# Patient Record
Sex: FEMALE | Race: BLACK OR AFRICAN AMERICAN | NOT HISPANIC OR LATINO | Employment: UNEMPLOYED | URBAN - METROPOLITAN AREA
[De-identification: names, ages, dates, MRNs, and addresses within clinical notes are randomized per-mention and may not be internally consistent; named-entity substitution may affect disease eponyms.]

---

## 2017-10-09 ENCOUNTER — OFFICE VISIT (OUTPATIENT)
Dept: OBSTETRICS AND GYNECOLOGY | Facility: CLINIC | Age: 27
End: 2017-10-09
Payer: MEDICAID

## 2017-10-09 ENCOUNTER — LAB VISIT (OUTPATIENT)
Dept: LAB | Facility: HOSPITAL | Age: 27
End: 2017-10-09
Attending: ADVANCED PRACTICE MIDWIFE
Payer: MEDICAID

## 2017-10-09 VITALS
WEIGHT: 217.38 LBS | BODY MASS INDEX: 34.93 KG/M2 | HEIGHT: 66 IN | DIASTOLIC BLOOD PRESSURE: 74 MMHG | SYSTOLIC BLOOD PRESSURE: 126 MMHG

## 2017-10-09 DIAGNOSIS — R11.2 NAUSEA AND VOMITING, INTRACTABILITY OF VOMITING NOT SPECIFIED, UNSPECIFIED VOMITING TYPE: Primary | ICD-10-CM

## 2017-10-09 DIAGNOSIS — N91.1 AMENORRHEA, SECONDARY: ICD-10-CM

## 2017-10-09 PROCEDURE — 36415 COLL VENOUS BLD VENIPUNCTURE: CPT | Mod: PO

## 2017-10-09 PROCEDURE — 86592 SYPHILIS TEST NON-TREP QUAL: CPT

## 2017-10-09 PROCEDURE — 86900 BLOOD TYPING SEROLOGIC ABO: CPT

## 2017-10-09 PROCEDURE — 87340 HEPATITIS B SURFACE AG IA: CPT

## 2017-10-09 PROCEDURE — 99999 PR PBB SHADOW E&M-NEW PATIENT-LVL II: CPT | Mod: PBBFAC,,, | Performed by: ADVANCED PRACTICE MIDWIFE

## 2017-10-09 PROCEDURE — 86850 RBC ANTIBODY SCREEN: CPT

## 2017-10-09 PROCEDURE — 85025 COMPLETE CBC W/AUTO DIFF WBC: CPT

## 2017-10-09 PROCEDURE — 86762 RUBELLA ANTIBODY: CPT

## 2017-10-09 PROCEDURE — 99213 OFFICE O/P EST LOW 20 MIN: CPT | Mod: TH,S$PBB,, | Performed by: ADVANCED PRACTICE MIDWIFE

## 2017-10-09 PROCEDURE — 85660 RBC SICKLE CELL TEST: CPT

## 2017-10-09 PROCEDURE — 99202 OFFICE O/P NEW SF 15 MIN: CPT | Mod: PBBFAC,PO,25 | Performed by: ADVANCED PRACTICE MIDWIFE

## 2017-10-09 PROCEDURE — 80053 COMPREHEN METABOLIC PANEL: CPT

## 2017-10-09 PROCEDURE — 86703 HIV-1/HIV-2 1 RESULT ANTBDY: CPT

## 2017-10-09 RX ORDER — PROMETHAZINE HYDROCHLORIDE 12.5 MG/1
12.5 TABLET ORAL 4 TIMES DAILY
Qty: 30 TABLET | Refills: 2 | Status: ON HOLD | OUTPATIENT
Start: 2017-10-09 | End: 2017-10-21 | Stop reason: HOSPADM

## 2017-10-09 RX ORDER — PROMETHAZINE HYDROCHLORIDE 25 MG/1
25 SUPPOSITORY RECTAL EVERY 6 HOURS PRN
Qty: 12 SUPPOSITORY | Refills: 1 | Status: SHIPPED | OUTPATIENT
Start: 2017-10-09 | End: 2017-12-26

## 2017-10-09 RX ORDER — PROMETHAZINE HYDROCHLORIDE 25 MG/1
TABLET ORAL
Refills: 0 | Status: ON HOLD | COMMUNITY
Start: 2017-09-30 | End: 2017-10-21 | Stop reason: HOSPADM

## 2017-10-09 RX ORDER — ONDANSETRON 4 MG/1
4 TABLET, FILM COATED ORAL EVERY 6 HOURS PRN
Qty: 30 TABLET | Refills: 1 | Status: ON HOLD | OUTPATIENT
Start: 2017-10-09 | End: 2017-10-21 | Stop reason: HOSPADM

## 2017-10-09 NOTE — PROGRESS NOTES
"CC: Absence of menses    Darryl Wall is a 27 y.o. female  presents with complaint of absence of menstruation.  She reports nausea/vomIting and ptyalism   UPT is positive.     LMP- 17 EDC 18 therefore 8w3d     History reviewed. No pertinent past medical history.  History reviewed. No pertinent surgical history.  Social History     Social History    Marital status: Single     Spouse name: N/A    Number of children: N/A    Years of education: N/A     Social History Main Topics    Smoking status: Never Smoker    Smokeless tobacco: Never Used    Alcohol use No    Drug use: No    Sexual activity: Yes     Partners: Male     Other Topics Concern    None     Social History Narrative    None     Family History   Problem Relation Age of Onset    Breast cancer Maternal Grandmother      OB History    Para Term  AB Living   2         2   SAB TAB Ectopic Multiple Live Births           2      # Outcome Date GA Lbr Mo/2nd Weight Sex Delivery Anes PTL Lv   2  09    F Vag-Spont   DEB   1  06    F Vag-Spont   DEB          /74 (BP Location: Right arm)   Ht 5' 6" (1.676 m)   Wt 98.6 kg (217 lb 6 oz)   LMP 2017   BMI 35.09 kg/m²     ROS:  GENERAL: Denies weight gain or weight loss. Feeling well overall.   SKIN: Denies rash or lesions.   HEAD: Denies head injury or headache.   NODES: Denies enlarged lymph nodes.   CHEST: Denies chest pain or shortness of breath.   CARDIOVASCULAR: Denies palpitations or left sided chest pain.   ABDOMEN: No abdominal pain, constipation, diarrhea, nausea, vomiting or rectal bleeding.   URINARY: No frequency, dysuria, hematuria, or burning on urination.  REPRODUCTIVE: See HPI.   BREASTS: The patient performs breast self-examination and denies pain, lumps, or nipple discharge.   HEMATOLOGIC: No easy bruisability or excessive bleeding.   MUSCULOSKELETAL: Denies joint pain or swelling.   NEUROLOGIC: Denies syncope or weakness. "   PSYCHIATRIC: Denies depression, anxiety or mood swings.    PE:   APPEARANCE: Well nourished, well developed, in no acute distress.  AFFECT: WNL, alert and oriented x 3.  PE deferred        ASSESSMENT and PLAN:  27yr old  with secondary amenorrhea and positive UPT  Significant N/V with ptyalism  Had hyperemesis with previous 2 children- Rx Phenegan po and pr and zofran  New OB and PN lab and US     Patient was counseled today on proper weight gain based on the [place] [facility]'s recommendations based on her pre-pregnancy weight. Discussed foods to avoid in pregnancy (i.e. sushi, fish that are high in mercury, deli meat, and unpasteurized cheeses). Discussed prenatal vitamin options (i.e. stool softener, DHA). Contingency screen offered - patient desires.    No Follow-up on file.

## 2017-10-10 LAB
ABO + RH BLD: NORMAL
ALBUMIN SERPL BCP-MCNC: 3.6 G/DL
ALP SERPL-CCNC: 55 U/L
ALT SERPL W/O P-5'-P-CCNC: 8 U/L
ANION GAP SERPL CALC-SCNC: 14 MMOL/L
AST SERPL-CCNC: 15 U/L
BASOPHILS # BLD AUTO: 0.01 K/UL
BASOPHILS NFR BLD: 0.2 %
BILIRUB SERPL-MCNC: 0.5 MG/DL
BLD GP AB SCN CELLS X3 SERPL QL: NORMAL
BUN SERPL-MCNC: 9 MG/DL
CALCIUM SERPL-MCNC: 9.7 MG/DL
CHLORIDE SERPL-SCNC: 102 MMOL/L
CO2 SERPL-SCNC: 21 MMOL/L
CREAT SERPL-MCNC: 0.8 MG/DL
DIFFERENTIAL METHOD: ABNORMAL
EOSINOPHIL # BLD AUTO: 0.1 K/UL
EOSINOPHIL NFR BLD: 1.2 %
ERYTHROCYTE [DISTWIDTH] IN BLOOD BY AUTOMATED COUNT: 12.6 %
EST. GFR  (AFRICAN AMERICAN): >60 ML/MIN/1.73 M^2
EST. GFR  (NON AFRICAN AMERICAN): >60 ML/MIN/1.73 M^2
GLUCOSE SERPL-MCNC: 96 MG/DL
HCT VFR BLD AUTO: 37.1 %
HGB BLD-MCNC: 12.4 G/DL
HGB S BLD QL SOLY: NEGATIVE
LYMPHOCYTES # BLD AUTO: 2.4 K/UL
LYMPHOCYTES NFR BLD: 37 %
MCH RBC QN AUTO: 31.9 PG
MCHC RBC AUTO-ENTMCNC: 33.4 G/DL
MCV RBC AUTO: 95 FL
MONOCYTES # BLD AUTO: 0.6 K/UL
MONOCYTES NFR BLD: 8.3 %
NEUTROPHILS # BLD AUTO: 3.5 K/UL
NEUTROPHILS NFR BLD: 53.1 %
PLATELET # BLD AUTO: 358 K/UL
PMV BLD AUTO: 9.9 FL
POTASSIUM SERPL-SCNC: 3.7 MMOL/L
PROT SERPL-MCNC: 8 G/DL
RBC # BLD AUTO: 3.89 M/UL
SODIUM SERPL-SCNC: 137 MMOL/L
WBC # BLD AUTO: 6.59 K/UL

## 2017-10-11 LAB
HBV SURFACE AG SERPL QL IA: NEGATIVE
RPR SER QL: NORMAL
RUBV IGG SER-ACNC: 9.5 IU/ML
RUBV IGG SER-IMP: ABNORMAL

## 2017-10-12 LAB — HIV 1+2 AB+HIV1 P24 AG SERPL QL IA: NEGATIVE

## 2017-10-18 ENCOUNTER — HOSPITAL ENCOUNTER (INPATIENT)
Facility: HOSPITAL | Age: 27
LOS: 3 days | Discharge: HOME | End: 2017-10-21
Attending: OBSTETRICS & GYNECOLOGY | Admitting: OBSTETRICS & GYNECOLOGY
Payer: MEDICAID

## 2017-10-18 ENCOUNTER — TELEPHONE (OUTPATIENT)
Dept: OBSTETRICS AND GYNECOLOGY | Facility: CLINIC | Age: 27
End: 2017-10-18

## 2017-10-18 DIAGNOSIS — O21.0 HYPEREMESIS GRAVIDARUM: ICD-10-CM

## 2017-10-18 PROCEDURE — 25000003 PHARM REV CODE 250: Performed by: OBSTETRICS & GYNECOLOGY

## 2017-10-18 PROCEDURE — 63600175 PHARM REV CODE 636 W HCPCS: Performed by: OBSTETRICS & GYNECOLOGY

## 2017-10-18 PROCEDURE — 11000001 HC ACUTE MED/SURG PRIVATE ROOM

## 2017-10-18 RX ORDER — ONDANSETRON 2 MG/ML
4 INJECTION INTRAMUSCULAR; INTRAVENOUS EVERY 6 HOURS PRN
Status: DISCONTINUED | OUTPATIENT
Start: 2017-10-18 | End: 2017-10-19

## 2017-10-18 RX ADMIN — PROMETHAZINE HYDROCHLORIDE 25 MG: 25 INJECTION INTRAMUSCULAR; INTRAVENOUS at 10:10

## 2017-10-18 NOTE — TELEPHONE ENCOUNTER
----- Message from Faustina Go sent at 10/18/2017 12:43 PM CDT -----  Contact: Christie/[facility]  Christie called to speak with the nurse, the patient is in the ER right now. She can be contacted at [phone].    Thanks,  Faustina

## 2017-10-18 NOTE — TELEPHONE ENCOUNTER
Spoke with Christie and the patient went to [facility] instead of [facility]. Patient has had no relief with zofran, phenergan PO or suppositories. Patient's urine is showing 4+ ketones and they are giving her about 2L of fluids but will need to be admitted. The patient is fine with them discharging her so she can go to [facility]. Munira spoke with Christie from [facility] as well Dr White since she is on call. Patient is going to lateral transfer from [facility] ER to [facility] ER.

## 2017-10-19 ENCOUNTER — TELEPHONE (OUTPATIENT)
Dept: OBSTETRICS AND GYNECOLOGY | Facility: CLINIC | Age: 27
End: 2017-10-19

## 2017-10-19 LAB
ALBUMIN SERPL BCP-MCNC: 3.1 G/DL
ALP SERPL-CCNC: 43 U/L
ALT SERPL W/O P-5'-P-CCNC: 6 U/L
ANION GAP SERPL CALC-SCNC: 9 MMOL/L
AST SERPL-CCNC: 12 U/L
BILIRUB SERPL-MCNC: 0.8 MG/DL
BUN SERPL-MCNC: 7 MG/DL
CALCIUM SERPL-MCNC: 9.2 MG/DL
CHLORIDE SERPL-SCNC: 106 MMOL/L
CO2 SERPL-SCNC: 21 MMOL/L
CREAT SERPL-MCNC: 0.6 MG/DL
EST. GFR  (AFRICAN AMERICAN): >60 ML/MIN/1.73 M^2
EST. GFR  (NON AFRICAN AMERICAN): >60 ML/MIN/1.73 M^2
GLUCOSE SERPL-MCNC: 84 MG/DL
MAGNESIUM SERPL-MCNC: 2.3 MG/DL
PHOSPHATE SERPL-MCNC: 3.3 MG/DL
POTASSIUM SERPL-SCNC: 3.3 MMOL/L
PROT SERPL-MCNC: 6.9 G/DL
SODIUM SERPL-SCNC: 136 MMOL/L

## 2017-10-19 PROCEDURE — 80053 COMPREHEN METABOLIC PANEL: CPT

## 2017-10-19 PROCEDURE — 63600175 PHARM REV CODE 636 W HCPCS: Performed by: OBSTETRICS & GYNECOLOGY

## 2017-10-19 PROCEDURE — 84100 ASSAY OF PHOSPHORUS: CPT

## 2017-10-19 PROCEDURE — 99223 1ST HOSP IP/OBS HIGH 75: CPT | Mod: ,,, | Performed by: OBSTETRICS & GYNECOLOGY

## 2017-10-19 PROCEDURE — 36415 COLL VENOUS BLD VENIPUNCTURE: CPT

## 2017-10-19 PROCEDURE — 11000001 HC ACUTE MED/SURG PRIVATE ROOM

## 2017-10-19 PROCEDURE — 83735 ASSAY OF MAGNESIUM: CPT

## 2017-10-19 PROCEDURE — 25000003 PHARM REV CODE 250: Performed by: OBSTETRICS & GYNECOLOGY

## 2017-10-19 RX ORDER — METOCLOPRAMIDE HYDROCHLORIDE 5 MG/ML
10 INJECTION INTRAMUSCULAR; INTRAVENOUS EVERY 6 HOURS
Status: DISCONTINUED | OUTPATIENT
Start: 2017-10-19 | End: 2017-10-21 | Stop reason: HOSPADM

## 2017-10-19 RX ADMIN — METOCLOPRAMIDE 10 MG: 5 INJECTION, SOLUTION INTRAMUSCULAR; INTRAVENOUS at 05:10

## 2017-10-19 RX ADMIN — METOCLOPRAMIDE 10 MG: 5 INJECTION, SOLUTION INTRAMUSCULAR; INTRAVENOUS at 12:10

## 2017-10-19 RX ADMIN — PROMETHAZINE HYDROCHLORIDE 25 MG: 25 INJECTION INTRAMUSCULAR; INTRAVENOUS at 08:10

## 2017-10-19 RX ADMIN — PROMETHAZINE HYDROCHLORIDE 25 MG: 25 INJECTION INTRAMUSCULAR; INTRAVENOUS at 12:10

## 2017-10-19 RX ADMIN — ASCORBIC ACID, VITAMIN A PALMITATE, CHOLECALCIFEROL, THIAMINE HYDROCHLORIDE, RIBOFLAVIN-5 PHOSPHATE SODIUM, PYRIDOXINE HYDROCHLORIDE, NIACINAMIDE, DEXPANTHENOL, ALPHA-TOCOPHEROL ACETATE, VITAMIN K1, FOLIC ACID, BIOTIN, CYANOCOBALAMIN: 200; 3300; 200; 6; 3.6; 6; 40; 15; 10; 150; 600; 60; 5 INJECTION, SOLUTION INTRAVENOUS at 06:10

## 2017-10-19 RX ADMIN — PROMETHAZINE HYDROCHLORIDE 25 MG: 25 INJECTION INTRAMUSCULAR; INTRAVENOUS at 06:10

## 2017-10-19 RX ADMIN — ASCORBIC ACID, VITAMIN A PALMITATE, CHOLECALCIFEROL, THIAMINE HYDROCHLORIDE, RIBOFLAVIN-5 PHOSPHATE SODIUM, PYRIDOXINE HYDROCHLORIDE, NIACINAMIDE, DEXPANTHENOL, ALPHA-TOCOPHEROL ACETATE, VITAMIN K1, FOLIC ACID, BIOTIN, CYANOCOBALAMIN: 200; 3300; 200; 6; 3.6; 6; 40; 15; 10; 150; 600; 60; 5 INJECTION, SOLUTION INTRAVENOUS at 01:10

## 2017-10-19 NOTE — PLAN OF CARE
Problem: Patient Care Overview  Goal: Plan of Care Review  Outcome: Ongoing (interventions implemented as appropriate)  Reviewed POC, including indications and possible side effects of administered medications. Pt verbalized understanding and teach back. Reported nausea early in the shift. Medication and non-pharmacological interventions effective. Pt remains free from injury. Spouse @ BS.    12 hour chart check complete.

## 2017-10-19 NOTE — PLAN OF CARE
CM met with patient at the bedside to assess for discharge needs.  Patient states that she has support at home and is independent.  She does not anticipate any discharge needs.  CM provided patient with a transitional care folder, information on advanced directives, information on pharmacy bedside delivery, and discharge planning begins on admission with contact information for [facility]ier[facility]    D/C Plan:  Home with no needs    KIANNA handed off to Brenda      10/19/17 8583   Discharge Assessment   Assessment Type Discharge Planning Assessment   Confirmed/corrected address and phone number on facesheet? Yes   Assessment information obtained from? Patient;Medical Record   Expected Length of Stay (days) (TBD)   Communicated expected length of stay with patient/caregiver yes   Prior to hospitilization cognitive status: Alert/Oriented   Prior to hospitalization functional status: Independent   Current cognitive status: Alert/Oriented   Current Functional Status: Independent   Facility Arrived From: home   Able to Return to Prior Arrangements yes   Is patient able to care for self after discharge? Yes   Who are your caregiver(s) and their phone number(s)? Eleazar Gross,   [phone]   Patient's perception of discharge disposition home or selfcare   Readmission Within The Last 30 Days no previous admission in last 30 days   Patient currently being followed by outpatient case management? No   Patient currently receives any other outside agency services? No   Equipment Currently Used at Home none   Do you have any problems affording any of your prescribed medications? No   Is the patient taking medications as prescribed? yes   Does the patient have transportation home? Yes   Transportation Available car;family or friend will provide   Dialysis Name and Scheduled days NA   Does the patient receive services at the [facility]? No   Discharge Plan A Home with family   Discharge Plan B Home with family   Patient/Family In  Agreement With Plan yes

## 2017-10-19 NOTE — PROGRESS NOTES
Pt stated that Zofran never works for her, she received Zofran today in [facility] and it did not help her again. MD notified and promethazine administered per order.

## 2017-10-19 NOTE — SUBJECTIVE & OBJECTIVE
Obstetric History       T0      L2     SAB0   TAB0   Ectopic0   Multiple0   Live Births2       # Outcome Date GA Lbr Mo/2nd Weight Sex Delivery Anes PTL Lv   2  09    F Vag-Spont   DEB   1  06    F Vag-Spont   DEB        History reviewed. No pertinent past medical history.  History reviewed. No pertinent surgical history.    PTA Medications   Medication Sig    ondansetron (ZOFRAN, AS HYDROCHLORIDE,) 4 MG tablet Take 1 tablet (4 mg total) by mouth every 6 (six) hours as needed for Nausea.    promethazine (PHENERGAN) 12.5 MG Tab Take 1 tablet (12.5 mg total) by mouth 4 (four) times daily.    promethazine (PHENERGAN) 25 MG suppository Place 1 suppository (25 mg total) rectally every 6 (six) hours as needed for Nausea.    promethazine (PHENERGAN) 25 MG tablet TK 1 T PO Q 6 H PRN FOR NV       Review of patient's allergies indicates:  No Known Allergies     Family History     Problem Relation (Age of Onset)    Breast cancer Maternal Grandmother        Social History Main Topics    Smoking status: Never Smoker    Smokeless tobacco: Never Used    Alcohol use No    Drug use: No    Sexual activity: Yes     Partners: Male     Review of Systems   Constitutional: Positive for activity change, appetite change and fatigue.   Gastrointestinal: Positive for nausea and vomiting.   All other systems reviewed and are negative.     Objective:     Vital Signs (Most Recent):  Temp: 98.4 °F (36.9 °C) (10/19/17 032)  Pulse: 68 (10/19/17 0322)  Resp: 18 (10/19/17 0322)  BP: (!) 105/58 (10/19/17 032)  SpO2: 98 % (10/19/17 0322) Vital Signs (24h Range):  Temp:  [97.5 °F (36.4 °C)-98.4 °F (36.9 °C)] 98.4 °F (36.9 °C)  Pulse:  [68-84] 68  Resp:  [18] 18  SpO2:  [96 %-98 %] 98 %  BP: (105-134)/(58-65) 105/58     Weight: 99.2 kg (218 lb 11.2 oz)  Body mass index is 34.25 kg/m².    Patient's last menstrual period was 2017 (exact date).    Physical Exam:   Constitutional: She is oriented  to person, place, and time. No distress.        Pulmonary/Chest: Effort normal.        Abdominal: Soft. She exhibits no distension. There is no tenderness. There is no guarding.     Genitourinary:   Genitourinary Comments: Denies bleeding/abnormal discharg           Musculoskeletal: Moves all extremeties.       Neurological: She is alert and oriented to person, place, and time.    Skin: Skin is warm and dry. She is not diaphoretic.    Psychiatric: She has a normal mood and affect. Her behavior is normal.       Laboratory:  I have personallly reviewed all pertinent lab results from the last 24 hours.    Diagnostic Results:  none

## 2017-10-19 NOTE — PROGRESS NOTES
Patient arrived to 552 as direct admit, accompanied by family member. Dr. White notified. Awaiting new orders.

## 2017-10-19 NOTE — TELEPHONE ENCOUNTER
----- Message from Brittney White MD sent at 10/19/2017  8:17 AM CDT -----  Pt in hospital. Getting dating u/s here. Can cancel ignacia's order

## 2017-10-19 NOTE — H&P
[facility] -   Obstetrics & Gynecology  History & Physical    Patient Name: Darryl Wall  MRN: [id number]  Admission Date: 10/18/2017  Primary Care Provider: J Carlos Haney NP    Subjective:     Chief Complaint/Reason for Admission: hyperemesis gravidarum    History of Present Illness:  26 yo  with pregnancy approximately 9w6d admitted for hyperemesis gravidarum        Obstetric History       T0      L2     SAB0   TAB0   Ectopic0   Multiple0   Live Births2       # Outcome Date GA Lbr Mo/2nd Weight Sex Delivery Anes PTL Lv   2  09    F Vag-Spont   DEB   1  06    F Vag-Spont   DEB        History reviewed. No pertinent past medical history.  History reviewed. No pertinent surgical history.    PTA Medications   Medication Sig    ondansetron (ZOFRAN, AS HYDROCHLORIDE,) 4 MG tablet Take 1 tablet (4 mg total) by mouth every 6 (six) hours as needed for Nausea.    promethazine (PHENERGAN) 12.5 MG Tab Take 1 tablet (12.5 mg total) by mouth 4 (four) times daily.    promethazine (PHENERGAN) 25 MG suppository Place 1 suppository (25 mg total) rectally every 6 (six) hours as needed for Nausea.    promethazine (PHENERGAN) 25 MG tablet TK 1 T PO Q 6 H PRN FOR NV       Review of patient's allergies indicates:  No Known Allergies     Family History     Problem Relation (Age of Onset)    Breast cancer Maternal Grandmother        Social History Main Topics    Smoking status: Never Smoker    Smokeless tobacco: Never Used    Alcohol use No    Drug use: No    Sexual activity: Yes     Partners: Male     Review of Systems   Constitutional: Positive for activity change, appetite change and fatigue.   Gastrointestinal: Positive for nausea and vomiting.   All other systems reviewed and are negative.     Objective:     Vital Signs (Most Recent):  Temp: 98.4 °F (36.9 °C) (10/19/17 0322)  Pulse: 68 (10/19/17 0322)  Resp: 18 (10/19/17 0322)  BP: (!) 105/58 (10/19/17  0322)  SpO2: 98 % (10/19/17 0322) Vital Signs (24h Range):  Temp:  [97.5 °F (36.4 °C)-98.4 °F (36.9 °C)] 98.4 °F (36.9 °C)  Pulse:  [68-84] 68  Resp:  [18] 18  SpO2:  [96 %-98 %] 98 %  BP: (105-134)/(58-65) 105/58     Weight: 99.2 kg (218 lb 11.2 oz)  Body mass index is 34.25 kg/m².    Patient's last menstrual period was 08/11/2017 (exact date).    Physical Exam:   Constitutional: She is oriented to person, place, and time. No distress.        Pulmonary/Chest: Effort normal.        Abdominal: Soft. She exhibits no distension. There is no tenderness. There is no guarding.     Genitourinary:   Genitourinary Comments: Denies bleeding/abnormal discharg           Musculoskeletal: Moves all extremeties.       Neurological: She is alert and oriented to person, place, and time.    Skin: Skin is warm and dry. She is not diaphoretic.    Psychiatric: She has a normal mood and affect. Her behavior is normal.       Laboratory:  I have personallly reviewed all pertinent lab results from the last 24 hours.    Diagnostic Results:  none    Assessment/Plan:     1. Hyperemesis gravidarum-on phenergan IV, will add reglan  2. Unconfirmed pregnancy-will do dating ultrasound  3. clinimix    Brittney White MD  Obstetrics & Gynecology  [facility] -

## 2017-10-19 NOTE — HOSPITAL COURSE
Pt receiving IVF & anti-emetics as well as electrolyte replacement. Reports zofran never works for her & phenergan po at home was not helping much.

## 2017-10-20 PROBLEM — O26.91: Status: ACTIVE | Noted: 2017-10-20

## 2017-10-20 LAB
ANION GAP SERPL CALC-SCNC: 10 MMOL/L
BUN SERPL-MCNC: 6 MG/DL
CALCIUM SERPL-MCNC: 9.4 MG/DL
CHLORIDE SERPL-SCNC: 106 MMOL/L
CO2 SERPL-SCNC: 19 MMOL/L
CREAT SERPL-MCNC: 0.6 MG/DL
EST. GFR  (AFRICAN AMERICAN): >60 ML/MIN/1.73 M^2
EST. GFR  (NON AFRICAN AMERICAN): >60 ML/MIN/1.73 M^2
GLUCOSE SERPL-MCNC: 76 MG/DL
POTASSIUM SERPL-SCNC: 3.6 MMOL/L
SODIUM SERPL-SCNC: 135 MMOL/L

## 2017-10-20 PROCEDURE — 97802 MEDICAL NUTRITION INDIV IN: CPT

## 2017-10-20 PROCEDURE — 25000003 PHARM REV CODE 250: Performed by: OBSTETRICS & GYNECOLOGY

## 2017-10-20 PROCEDURE — 63600175 PHARM REV CODE 636 W HCPCS: Performed by: OBSTETRICS & GYNECOLOGY

## 2017-10-20 PROCEDURE — 11000001 HC ACUTE MED/SURG PRIVATE ROOM

## 2017-10-20 PROCEDURE — 99232 SBSQ HOSP IP/OBS MODERATE 35: CPT | Mod: ,,, | Performed by: PHYSICIAN ASSISTANT

## 2017-10-20 PROCEDURE — 36415 COLL VENOUS BLD VENIPUNCTURE: CPT

## 2017-10-20 PROCEDURE — 80048 BASIC METABOLIC PNL TOTAL CA: CPT

## 2017-10-20 RX ORDER — DEXTROSE MONOHYDRATE AND SODIUM CHLORIDE 5; .9 G/100ML; G/100ML
INJECTION, SOLUTION INTRAVENOUS CONTINUOUS
Status: DISCONTINUED | OUTPATIENT
Start: 2017-10-20 | End: 2017-10-21 | Stop reason: HOSPADM

## 2017-10-20 RX ORDER — SCOLOPAMINE TRANSDERMAL SYSTEM 1 MG/1
1 PATCH, EXTENDED RELEASE TRANSDERMAL
Status: DISCONTINUED | OUTPATIENT
Start: 2017-10-20 | End: 2017-10-21 | Stop reason: HOSPADM

## 2017-10-20 RX ADMIN — ASCORBIC ACID, VITAMIN A PALMITATE, CHOLECALCIFEROL, THIAMINE HYDROCHLORIDE, RIBOFLAVIN-5 PHOSPHATE SODIUM, PYRIDOXINE HYDROCHLORIDE, NIACINAMIDE, DEXPANTHENOL, ALPHA-TOCOPHEROL ACETATE, VITAMIN K1, FOLIC ACID, BIOTIN, CYANOCOBALAMIN: 200; 3300; 200; 6; 3.6; 6; 40; 15; 10; 150; 600; 60; 5 INJECTION, SOLUTION INTRAVENOUS at 08:10

## 2017-10-20 RX ADMIN — PROMETHAZINE HYDROCHLORIDE 25 MG: 25 INJECTION INTRAMUSCULAR; INTRAVENOUS at 07:10

## 2017-10-20 RX ADMIN — SCOLOPAMINE TRANSDERMAL SYSTEM 1 PATCH: 1 PATCH, EXTENDED RELEASE TRANSDERMAL at 09:10

## 2017-10-20 RX ADMIN — METOCLOPRAMIDE 10 MG: 5 INJECTION, SOLUTION INTRAMUSCULAR; INTRAVENOUS at 12:10

## 2017-10-20 RX ADMIN — METOCLOPRAMIDE 10 MG: 5 INJECTION, SOLUTION INTRAMUSCULAR; INTRAVENOUS at 05:10

## 2017-10-20 RX ADMIN — DEXTROSE AND SODIUM CHLORIDE: 5; .9 INJECTION, SOLUTION INTRAVENOUS at 12:10

## 2017-10-20 RX ADMIN — METOCLOPRAMIDE 10 MG: 5 INJECTION, SOLUTION INTRAMUSCULAR; INTRAVENOUS at 06:10

## 2017-10-20 RX ADMIN — METOCLOPRAMIDE 10 MG: 5 INJECTION, SOLUTION INTRAMUSCULAR; INTRAVENOUS at 11:10

## 2017-10-20 NOTE — ASSESSMENT & PLAN NOTE
Patient still having nausea with reglan & IV phenergan; will add scopolamine patch and attempt advancing diet today  Continue Clinimix for IV hydration & electrolyte replacement  repeat BMP   US shows intrauterine fetus of appx 10w1d gestation.

## 2017-10-20 NOTE — PLAN OF CARE
Problem: Patient Care Overview  Goal: Plan of Care Review  Outcome: Ongoing (interventions implemented as appropriate)  Pt reports n/v, clinimix administered per order, Pt remains free of injury, pt denies pain, no s/s of distress. 24 hour chart check completed. Will continue to monitor.

## 2017-10-20 NOTE — SUBJECTIVE & OBJECTIVE
Interval History:  Patient still reports nausea that is not relieved by current medications; she tried to eat something but was unable to tolerate a diet at this time - she vomited after she ate     Scheduled Meds:   metoclopramide HCl  10 mg Intravenous Q6H    scopolamine  1 patch Transdermal Q3 Days     Continuous Infusions:   Amino acid 4.25% - dextrose 5% (CLINIMIX-E) solution with additives (1L provides 42.5 gm AA, 50 gm CHO (170 kcal/L dextrose), Na 35, K 30, Mg 5, Ca 4.5, Acetate 70, Cl 39, Phos 15) 125 mL/hr at 10/20/17 0824     PRN Meds:promethazine (PHENERGAN) IVPB    Review of patient's allergies indicates:  No Known Allergies    Objective:     Vital Signs (Most Recent):  Temp: 98.7 °F (37.1 °C) (10/20/17 0757)  Pulse: 77 (10/20/17 0757)  Resp: 16 (10/20/17 0757)  BP: 122/62 (10/20/17 0757)  SpO2: 98 % (10/20/17 0757) Vital Signs (24h Range):  Temp:  [97.2 °F (36.2 °C)-98.7 °F (37.1 °C)] 98.7 °F (37.1 °C)  Pulse:  [74-79] 77  Resp:  [16-18] 16  SpO2:  [98 %-100 %] 98 %  BP: (112-129)/(59-66) 122/62     Weight: 99.2 kg (218 lb 11.2 oz)  Body mass index is 34.25 kg/m².  Patient's last menstrual period was 08/11/2017 (exact date).    I&O (Last 24H):  Intake/Output - Last 3 Shifts       10/18 0700 - 10/19 0659 10/19 0700 - 10/20 0659 10/20 0700 - 10/21 0659    IV Piggyback 50 150     .3 2972.9     Total Intake(mL/kg) 606.3 (6.1) 3122.9 (31.5)     Net +606.3 +3122.9             Urine Occurrence 1 x      Stool Occurrence  1 x             Laboratory:  Recent Lab Results     None        I have personallly reviewed all pertinent lab results from the last 24 hours.    ROS  Constitutional: Positive for activity change, appetite change and fatigue.   Gastrointestinal: Positive for nausea and vomiting.   All other systems reviewed and are negative.      Physical Exam:   Constitutional: She is oriented to person, place, and time. No distress.        Pulmonary/Chest: Effort normal.        Abdominal: Soft. She  exhibits no distension. There is no tenderness. There is no guarding.     Genitourinary:   Genitourinary Comments: Denies bleeding/abnormal discharg           Musculoskeletal: Moves all extremeties.       Neurological: She is alert and oriented to person, place, and time.    Skin: Skin is warm and dry. She is not diaphoretic.    Psychiatric: She has a normal mood and affect. Her behavior is normal.

## 2017-10-20 NOTE — PLAN OF CARE
Problem: Patient Care Overview  Goal: Plan of Care Review  Outcome: Ongoing (interventions implemented as appropriate)  Remained free from injury. Ambulating independently. Throwing up when sipping water. Clinimix contined. 12 hour chart check complete.

## 2017-10-20 NOTE — PLAN OF CARE
Problem: Patient Care Overview  Goal: Plan of Care Review  Outcome: Ongoing (interventions implemented as appropriate)  Fall precautions maintained, pt free from falls/injuries  PT repositions and ambulates independently  Pt has no c/o pain  Nausea and vomiting managed w/ scopolamine patch and reglan  clinimix DC, pt is on clear liquid diet  POC and medications reviewed with pt, pt verbalized understanding.  Side rails x 2 up, bed locked and low.  No signs/symptoms of acute distress.  Chart check done. Will cont to monitor.

## 2017-10-20 NOTE — PROGRESS NOTES
"  [facility] -   Adult Nutrition  Consult Note    SUMMARY     Recommendations    Recommendation/Intervention: 1. Advance diet when medically able as tolerated to Regular. 2. Consider adding oral supplements with meals (boost plus). 3. Will continue to encourage PO intake.   Goals: Intake of 85 % - 100 % of meals by next RD visit  Nutrition Goal Status: new  Communication of RD Recs:  (care plan and sticky note)    Reason for Assessment    Reason for Assessment: identified at risk by screening criteria   Diagnosis:  1. Hyperemesis gravidarum      Hx:History reviewed. No pertinent past medical history.       General Information Comments: Patient reports no nausea or vomiting at this time. Patient reports PO intake 50 %.  10w1d gestation.     Nutrition Discharge Planning: General healthful diet     Nutrition Prescription Ordered    Current Diet Order: full liquid diet       Evaluation of Received Nutrients/Fluid Intake         Energy Calories Required: not meeting needs         Protein Required: not meeting needs        % Intake of Estimated Energy Needs: 25 - 50 %  % Meal Intake: 50%     Nutrition Risk Screen     Nutrition Risk Screen: no indicators present, other (see comments) (hyperemesis)    Nutrition/Diet History       Typical Food/Fluid Intake: good intake at home  Food Preferences: none reported including Restorationist or cultural           Labs/Tests/Procedures/Meds       Pertinent Labs Reviewed: reviewed  Pertinent Labs Comments: K 3.3, Alb 3.1, ALT 6   Pertinent Medications Reviewed: reviewed       Physical Findings    Overall Physical Appearance: nourished     Oral/Mouth Cavity:  (teeth missing)  Skin:  (Dez Score 22)    Anthropometrics    Temp: 98 °F (36.7 °C)     Height: 5' 7" (170.2 cm)  Weight Method: Bed Scale  Weight: 99.2 kg (218 lb 11.1 oz)     Ideal Body Weight (IBW), Female: 135 lb     % Ideal Body Weight, Female (lb): 162 lb  BMI (Calculated): 34.3  BMI Grade: 30 - 34.9- obesity - " grade I        Estimated/Assessed Needs    Weight Used For Calorie Calculations: 99.2 kg (218 lb 11.1 oz)   Height (cm): 170.2 cm  Energy Calorie Requirements (kcal): 2200 kcal      RMR ([place]-St. Jeor Equation): 1749.62     Weight Used For Protein Calculations: 99.2 kg (218 lb 11.1 oz)     1.1 gm Protein (gm): 109.35     Fluid Need Method: RDA Method (1ml/amada or per MD)        RDA Method (mL): 2200               Assessment and Plan    * Hyperemesis gravidarum    Nutrition Problem  Inadequate energy intake     Related to (etiology):   Current medical condition    Signs and Symptoms (as evidenced by):   PO intake ~ 50 %, not meeting needs    Interventions/Recommendations (treatment strategy):  See RD recs above    Nutrition Diagnosis Status:   New              Monitor and Evaluation    Food and Nutrient Intake: energy intake  Food and Nutrient Adminstration: diet order  Knowledge/Beliefs/Attitudes: beliefs and attitudes  Anthropometric Measurements: weight  Biochemical Data, Medical Tests and Procedures: electrolyte and renal panel, glucose/endocrine profile  Nutrition-Focused Physical Findings: overall appearance    Nutrition Follow-Up    RD Follow-up?: Yes (2xweekly)

## 2017-10-20 NOTE — PROGRESS NOTES
[facility]  Obstetrics & Gynecology  Progress Note    Patient Name: Darryl Wall  MRN: [id number]  Admission Date: 10/18/2017  Primary Care Provider: J Carlos Haney NP  Principal Problem: Hyperemesis gravidarum    Subjective:     HPI:  28 yo  with pregnancy approximately 9w6d admitted for hyperemesis gravidarum    Interval History:  Patient still reports nausea that is not relieved by current medications; she tried to eat something but was unable to tolerate a diet at this time - she vomited after she ate     Scheduled Meds:   metoclopramide HCl  10 mg Intravenous Q6H    scopolamine  1 patch Transdermal Q3 Days     Continuous Infusions:   Amino acid 4.25% - dextrose 5% (CLINIMIX-E) solution with additives (1L provides 42.5 gm AA, 50 gm CHO (170 kcal/L dextrose), Na 35, K 30, Mg 5, Ca 4.5, Acetate 70, Cl 39, Phos 15) 125 mL/hr at 10/20/17 0824     PRN Meds:promethazine (PHENERGAN) IVPB    Review of patient's allergies indicates:  No Known Allergies    Objective:     Vital Signs (Most Recent):  Temp: 98.7 °F (37.1 °C) (10/20/17 075)  Pulse: 77 (10/20/17 075)  Resp: 16 (10/20/17 0757)  BP: 122/62 (10/20/17 0757)  SpO2: 98 % (10/20/17 075) Vital Signs (24h Range):  Temp:  [97.2 °F (36.2 °C)-98.7 °F (37.1 °C)] 98.7 °F (37.1 °C)  Pulse:  [74-79] 77  Resp:  [16-18] 16  SpO2:  [98 %-100 %] 98 %  BP: (112-129)/(59-66) 122/62     Weight: 99.2 kg (218 lb 11.2 oz)  Body mass index is 34.25 kg/m².  Patient's last menstrual period was 2017 (exact date).    I&O (Last 24H):  Intake/Output - Last 3 Shifts       10/18 0700 - 10/19 0659 10/19 0700 - 10/20 0659 10/20 0700 - 10/21 0659    IV Piggyback 50 150     .3 2972.9     Total Intake(mL/kg) 606.3 (6.1) 3122.9 (31.5)     Net +606.3 +3122.9             Urine Occurrence 1 x      Stool Occurrence  1 x             Laboratory:  Recent Lab Results     None        I have personallly reviewed all pertinent lab results from the last 24  hours.    ROS  Constitutional: Positive for activity change, appetite change and fatigue.   Gastrointestinal: Positive for nausea and vomiting.   All other systems reviewed and are negative.      Physical Exam:   Constitutional: She is oriented to person, place, and time. No distress.        Pulmonary/Chest: Effort normal.        Abdominal: Soft. She exhibits no distension. There is no tenderness. There is no guarding.     Genitourinary:   Genitourinary Comments: Denies bleeding/abnormal discharg           Musculoskeletal: Moves all extremeties.       Neurological: She is alert and oriented to person, place, and time.    Skin: Skin is warm and dry. She is not diaphoretic.    Psychiatric: She has a normal mood and affect. Her behavior is normal.            Assessment/Plan:     * Hyperemesis gravidarum    Patient still having nausea with reglan & IV phenergan; will add scopolamine patch and attempt advancing diet today  Continue Clinimix for IV hydration & electrolyte replacement  repeat BMP   US shows intrauterine fetus of appx 10w1d gestation.                   Lianne Banks PA-C  Obstetrics & Gynecology  [facility]

## 2017-10-20 NOTE — ASSESSMENT & PLAN NOTE
Nutrition Problem  Inadequate energy intake     Related to (etiology):   Current medical condition    Signs and Symptoms (as evidenced by):   PO intake ~ 50 %, not meeting needs    Interventions/Recommendations (treatment strategy):  See RD recs above    Nutrition Diagnosis Status:   New

## 2017-10-20 NOTE — PLAN OF CARE
Problem: Patient Care Overview  Goal: Plan of Care Review  Outcome: Ongoing (interventions implemented as appropriate)  Recommendations     Recommendation/Intervention: 1. Advance diet when medically able as tolerated to Regular. 2. Consider adding oral supplements with meals (boost plus). 3. Will continue to encourage PO intake.   Goals: Intake of 85 % - 100 % of meals by next RD visit  Nutrition Goal Status: new  Communication of RD Recs:  (care plan and sticky note)

## 2017-10-21 ENCOUNTER — NURSE TRIAGE (OUTPATIENT)
Dept: ADMINISTRATIVE | Facility: CLINIC | Age: 27
End: 2017-10-21

## 2017-10-21 VITALS
OXYGEN SATURATION: 99 % | RESPIRATION RATE: 14 BRPM | BODY MASS INDEX: 34.33 KG/M2 | TEMPERATURE: 99 F | WEIGHT: 218.69 LBS | HEIGHT: 67 IN | SYSTOLIC BLOOD PRESSURE: 118 MMHG | DIASTOLIC BLOOD PRESSURE: 69 MMHG | HEART RATE: 84 BPM

## 2017-10-21 PROCEDURE — 99232 SBSQ HOSP IP/OBS MODERATE 35: CPT | Mod: ,,, | Performed by: OBSTETRICS & GYNECOLOGY

## 2017-10-21 PROCEDURE — 63600175 PHARM REV CODE 636 W HCPCS: Performed by: OBSTETRICS & GYNECOLOGY

## 2017-10-21 PROCEDURE — 25000003 PHARM REV CODE 250: Performed by: OBSTETRICS & GYNECOLOGY

## 2017-10-21 RX ORDER — METOCLOPRAMIDE 10 MG/1
10 TABLET ORAL EVERY 6 HOURS PRN
Qty: 60 TABLET | Refills: 3 | Status: SHIPPED | OUTPATIENT
Start: 2017-10-21 | End: 2017-12-26 | Stop reason: SDUPTHER

## 2017-10-21 RX ORDER — SCOLOPAMINE TRANSDERMAL SYSTEM 1 MG/1
1 PATCH, EXTENDED RELEASE TRANSDERMAL
Qty: 4 PATCH | Refills: 3 | Status: SHIPPED | OUTPATIENT
Start: 2017-10-23 | End: 2017-11-18 | Stop reason: SDUPTHER

## 2017-10-21 RX ORDER — PROMETHAZINE HYDROCHLORIDE 25 MG/1
25 TABLET ORAL EVERY 6 HOURS PRN
COMMUNITY
End: 2017-12-26 | Stop reason: SDUPTHER

## 2017-10-21 RX ADMIN — METOCLOPRAMIDE 10 MG: 5 INJECTION, SOLUTION INTRAMUSCULAR; INTRAVENOUS at 12:10

## 2017-10-21 RX ADMIN — DEXTROSE AND SODIUM CHLORIDE: 5; .9 INJECTION, SOLUTION INTRAVENOUS at 05:10

## 2017-10-21 RX ADMIN — METOCLOPRAMIDE 10 MG: 5 INJECTION, SOLUTION INTRAMUSCULAR; INTRAVENOUS at 11:10

## 2017-10-21 RX ADMIN — METOCLOPRAMIDE 10 MG: 5 INJECTION, SOLUTION INTRAMUSCULAR; INTRAVENOUS at 05:10

## 2017-10-21 RX ADMIN — PROMETHAZINE HYDROCHLORIDE 25 MG: 25 INJECTION INTRAMUSCULAR; INTRAVENOUS at 11:10

## 2017-10-21 NOTE — DISCHARGE SUMMARY
[facility] -   Obstetrics & Gynecology  Discharge Summary    Patient Name: Darryl Wall  MRN: [id number]  Admission Date: 10/18/2017  Hospital Length of Stay: 3 days  Discharge Date and Time: 10/21/17  Attending Physician: Brittney White MD   Discharging Provider: Brittney White MD  Primary Care Provider: J Carlos Haney NP    HPI:  26 yo  with pregnancy approximately 9w6d admitted for hyperemesis gravidarum    Hospital Course:  Pt receiving IVF & anti-emetics as well as electrolyte replacement. Reports zofran never works for her & phenergan po at home was not helping much.    * No surgery found *         Significant Diagnostic Studies: Labs:   BMP:   Recent Labs  Lab 10/20/17  1603   GLU 76   *   K 3.6      CO2 19*   BUN 6   CREATININE 0.6   CALCIUM 9.4    and CMP   Recent Labs  Lab 10/20/17  1603   *   K 3.6      CO2 19*   GLU 76   BUN 6   CREATININE 0.6   CALCIUM 9.4   ANIONGAP 10   ESTGFRAFRICA >60   EGFRNONAA >60       Pending Diagnostic Studies:     None        Final Active Diagnoses:    Diagnosis Date Noted POA    PRINCIPAL PROBLEM:  Hyperemesis gravidarum [O21.0] 10/18/2017 Yes    Complication related to pregnancy, first trimester [O26.91] 10/20/2017 Yes      Problems Resolved During this Admission:    Diagnosis Date Noted Date Resolved POA        Discharged Condition: good    Disposition:     Follow Up:  Follow-up Information     Please follow up.    Why:  keep current appt scheduled next week               Patient Instructions:   No discharge procedures on file.  Medications:  Reconciled Home Medications:   Current Discharge Medication List      START taking these medications    Details   metoclopramide HCl (REGLAN) 10 MG tablet Take 1 tablet (10 mg total) by mouth every 6 (six) hours as needed.  Qty: 60 tablet, Refills: 3      scopolamine (TRANSDERM-SCOP) 1.3-1.5 mg (1 mg over 3 days) Place 1 patch onto the skin Every 3 (three) days.  Qty: 4 patch,  Refills: 3         CONTINUE these medications which have NOT CHANGED    Details   promethazine (PHENERGAN) 25 MG suppository Place 1 suppository (25 mg total) rectally every 6 (six) hours as needed for Nausea.  Qty: 12 suppository, Refills: 1    Associated Diagnoses: Nausea and vomiting, intractability of vomiting not specified, unspecified vomiting type         STOP taking these medications       ondansetron (ZOFRAN, AS HYDROCHLORIDE,) 4 MG tablet Comments:   Reason for Stopping:         promethazine (PHENERGAN) 12.5 MG Tab Comments:   Reason for Stopping:         promethazine (PHENERGAN) 25 MG tablet Comments:   Reason for Stopping:               Brittney White MD  Obstetrics & Gynecology  [facility] -

## 2017-10-21 NOTE — PLAN OF CARE
Problem: Patient Care Overview  Goal: Plan of Care Review  Outcome: Outcome(s) achieved Date Met: 10/21/17  To be discharged today. Ambulating independently, tolerating well. Tolerating regular diet. Chart check complete.

## 2017-10-21 NOTE — SUBJECTIVE & OBJECTIVE
Interval History: pt currently on phenergan & reglan IV, scopolamine patch. Pt reports tolerated few bites of regular food & juice here & there, has more energy now. Would like to be discharged home    Scheduled Meds:   metoclopramide HCl  10 mg Intravenous Q6H    scopolamine  1 patch Transdermal Q3 Days     Continuous Infusions:   dextrose 5 % and 0.9 % NaCl 125 mL/hr at 10/21/17 0546     PRN Meds:promethazine (PHENERGAN) IVPB    Review of patient's allergies indicates:  No Known Allergies    Objective:     Vital Signs (Most Recent):  Temp: 98.5 °F (36.9 °C) (10/21/17 0859)  Pulse: 84 (10/21/17 0859)  Resp: 14 (10/21/17 0859)  BP: 118/69 (10/21/17 0859)  SpO2: 99 % (10/21/17 0859) Vital Signs (24h Range):  Temp:  [98 °F (36.7 °C)-99 °F (37.2 °C)] 98.5 °F (36.9 °C)  Pulse:  [69-95] 84  Resp:  [14-18] 14  SpO2:  [96 %-100 %] 99 %  BP: (102-118)/(57-69) 118/69     Weight: 99.2 kg (218 lb 11.1 oz)  Body mass index is 34.25 kg/m².  Patient's last menstrual period was 08/11/2017 (exact date).    I&O (Last 24H):      Intake/Output Summary (Last 24 hours) at 10/21/17 1028  Last data filed at 10/21/17 0600    <table CELLSPACING=0 CELLPADDING=0 BORDER=1>  <tr><td PJUGA=739%></td>  <td MTFFS=965%><b>Gross per 24 hour</b></td></tr>  <tr><td OVOTT=004%>Intake</td>  <td YRBBX=950%>          3127.08 ml</td></tr>  <tr><td XMEBM=251%>Output</td>  <td MKGBF=124%>                0 ml</td></tr>  <tr><td WVWUJ=656%><b>Net</b></td>  <td SYOGL=027%>          3127.08 ml</td></tr>  </table>      Laboratory:  I have personallly reviewed all pertinent lab results from the last 24 hours.    Diagnostic Results:  US: Reviewed    Physical Exam:   Constitutional: She is oriented to person, place, and time. No distress.        Pulmonary/Chest: Effort normal.        Abdominal: Soft. She exhibits no distension. There is no tenderness.     Genitourinary:   Genitourinary Comments: Denies bleeding           Musculoskeletal: Normal range of motion.        Neurological: She is alert and oriented to person, place, and time.    Skin: Skin is warm and dry.    Psychiatric: She has a normal mood and affect. Her behavior is normal.

## 2017-10-21 NOTE — PROGRESS NOTES
[facility] -   Obstetrics & Gynecology  Progress Note    Patient Name: Darryl Wall  MRN: [id number]  Admission Date: 10/18/2017  Primary Care Provider: J Carlos Haney NP  Principal Problem: Hyperemesis gravidarum    Subjective:     HPI:  26 yo  with pregnancy approximately 9w6d admitted for hyperemesis gravidarum    Interval History: pt currently on phenergan & reglan IV, scopolamine patch. Pt reports tolerated few bites of regular food & juice here & there, has more energy now. Would like to be discharged home    Scheduled Meds:   metoclopramide HCl  10 mg Intravenous Q6H    scopolamine  1 patch Transdermal Q3 Days     Continuous Infusions:   dextrose 5 % and 0.9 % NaCl 125 mL/hr at 10/21/17 0546     PRN Meds:promethazine (PHENERGAN) IVPB    Review of patient's allergies indicates:  No Known Allergies    Objective:     Vital Signs (Most Recent):  Temp: 98.5 °F (36.9 °C) (10/21/17 0859)  Pulse: 84 (10/21/17 0859)  Resp: 14 (10/21/17 0859)  BP: 118/69 (10/21/17 0859)  SpO2: 99 % (10/21/17 0859) Vital Signs (24h Range):  Temp:  [98 °F (36.7 °C)-99 °F (37.2 °C)] 98.5 °F (36.9 °C)  Pulse:  [69-95] 84  Resp:  [14-18] 14  SpO2:  [96 %-100 %] 99 %  BP: (102-118)/(57-69) 118/69     Weight: 99.2 kg (218 lb 11.1 oz)  Body mass index is 34.25 kg/m².  Patient's last menstrual period was 2017 (exact date).    I&O (Last 24H):      Intake/Output Summary (Last 24 hours) at 10/21/17 1028  Last data filed at 10/21/17 0600    <table CELLSPACING=0 CELLPADDING=0 BORDER=1>  <tr><td CFBLG=680%></td>  <td IWUTW=795%><b>Gross per 24 hour</b></td></tr>  <tr><td FWWYP=612%>Intake</td>  <td YLIYL=403%>          3127.08 ml</td></tr>  <tr><td AHWBN=635%>Output</td>  <td ETZVM=148%>                0 ml</td></tr>  <tr><td LBOTJ=207%><b>Net</b></td>  <td UZNAN=439%>          3127.08 ml</td></tr>  </table>      Laboratory:  I have personallly reviewed all pertinent lab results from the last 24  hours.    Diagnostic Results:  US: Reviewed    Physical Exam:   Constitutional: She is oriented to person, place, and time. No distress.        Pulmonary/Chest: Effort normal.        Abdominal: Soft. She exhibits no distension. There is no tenderness.     Genitourinary:   Genitourinary Comments: Denies bleeding           Musculoskeletal: Normal range of motion.       Neurological: She is alert and oriented to person, place, and time.    Skin: Skin is warm and dry.    Psychiatric: She has a normal mood and affect. Her behavior is normal.       Assessment/Plan:     * Hyperemesis gravidarum    Patient still having nausea with reglan & IV phenergan; will add scopolamine patch and attempt advancing diet today  Continue Clinimix for IV hydration & electrolyte replacement  repeat BMP   US shows intrauterine fetus of appx 10w1d gestation.                   Brittney White MD  Obstetrics & Gynecology  [facility] -

## 2017-10-21 NOTE — PLAN OF CARE
Problem: Patient Care Overview  Goal: Plan of Care Review  Outcome: Ongoing (interventions implemented as appropriate)  Patient AAO and VSS. Unable to progress past full liquid diet yet. Pt vomited at beginning of shift; reported full relief after Phenergan until around 0045 when Reglan was given. Denies pain. IVF administered as ordered.  Remains free of injury. Fall precautions maintained with bed low and wheels locked. Chart review for 24 hours completed. Will continue to monitor till discharge.

## 2017-10-23 ENCOUNTER — TELEPHONE (OUTPATIENT)
Dept: OBSTETRICS AND GYNECOLOGY | Facility: CLINIC | Age: 27
End: 2017-10-23

## 2017-10-23 NOTE — TELEPHONE ENCOUNTER
----- Message from Michelle Jackson sent at 10/23/2017 12:08 PM CDT -----  states that she was released from hosp & patches were prescribed but they're $77. pls adv..[phone] (home)   .  [facility] - [facility] [facility] - [address] AT [facility] & [place]  [address] [address]  Phone: [phone] Fax: [phone]

## 2017-10-24 NOTE — TELEPHONE ENCOUNTER
No generic for scopolamine. Message left for pt. rec phenergan suppository (pt requested po) & alternate w/ reglan po

## 2017-10-25 ENCOUNTER — TELEPHONE (OUTPATIENT)
Dept: OBSTETRICS AND GYNECOLOGY | Facility: CLINIC | Age: 27
End: 2017-10-25

## 2017-10-25 ENCOUNTER — INITIAL PRENATAL (OUTPATIENT)
Dept: OBSTETRICS AND GYNECOLOGY | Facility: CLINIC | Age: 27
End: 2017-10-25
Payer: MEDICAID

## 2017-10-25 VITALS
BODY MASS INDEX: 34.25 KG/M2 | DIASTOLIC BLOOD PRESSURE: 70 MMHG | SYSTOLIC BLOOD PRESSURE: 100 MMHG | WEIGHT: 218.69 LBS

## 2017-10-25 DIAGNOSIS — Z34.01 PRIMIGRAVIDA IN FIRST TRIMESTER: ICD-10-CM

## 2017-10-25 PROCEDURE — 99213 OFFICE O/P EST LOW 20 MIN: CPT | Mod: TH,S$PBB,, | Performed by: ADVANCED PRACTICE MIDWIFE

## 2017-10-25 PROCEDURE — 99212 OFFICE O/P EST SF 10 MIN: CPT | Mod: PBBFAC,PO | Performed by: ADVANCED PRACTICE MIDWIFE

## 2017-10-25 PROCEDURE — 99999 PR PBB SHADOW E&M-EST. PATIENT-LVL II: CPT | Mod: PBBFAC,,, | Performed by: ADVANCED PRACTICE MIDWIFE

## 2017-10-25 PROCEDURE — 87591 N.GONORRHOEAE DNA AMP PROB: CPT

## 2017-10-25 PROCEDURE — 88175 CYTOPATH C/V AUTO FLUID REDO: CPT

## 2017-10-25 NOTE — PATIENT INSTRUCTIONS
Pregnancy    Your exam today shows that you are pregnant.  Pregnancy symptoms  During pregnancy your bodys hormones change. This causes physical and emotional changes. This is normal. Knowing what to expect is important for your piece of mind and so you know when to seek help for a problem. Here are some of the most common symptoms:  · Morning sickness or nausea. This can happen any time of the day or night.  · Tender, swollen breasts  · Need to urinate frequently  · Tiredness or fatigue  · Dizziness  · Indigestion or heartburn  · Food cravings or turn-offs  · Constipation  · Emotional changes. This can range from anxiety to excitement to depression.  General care for a healthy pregnancy  Here are things you can do to help make sure your baby is born healthy:  · Rest when you feel tired. This is especially true in the later months of pregnancy.  · Drink more fluids. Your body needs more fluids than you may be used to. Drink 8 to10 glasses of juice, milk, or water every day.  · Eat well-balanced meals. Eat at regular times to give your body enough protein. You can expect to gain about 30 pounds during the pregnancy. Dont try to diet or lose weight while you are pregnant.  · Take a prenatal vitamin every day. This helps you meet the extra nutritional needs of pregnancy.  · Dont take any other medicine during your pregnancy unless your healthcare provider tells you to. This includes prescription medicines and those you buy over the counter. Many medicines can harm the growing baby.  · If you have nausea or vomiting, dont eat greasy or fried foods. Eat several smaller meals throughout the day rather than 3 large meals.  · If you smoke, you must stop. The nicotine you breathe in goes right to the baby.  · Stay away from alcohol, even in moderate amounts. Daily drinking will harm your baby and can cause permanent brain damage.  · Dont use recreational drugs, especially cocaine, crack, and heroin. These will harm  your baby. Also avoid marijuana.  · If you were using recreational drugs or prescribed medicine when you found out that you were pregnant, talk with your healthcare provider about possible effects on your growing baby.  · If you have medical problems that you need to take medicine for, talk with your healthcare provider.  Follow-up care  Call your healthcare provider to arrange for prenatal care. Prenatal care is important. You can see your family provider, a pregnancy specialist (obstetrician), or a primary care clinic.  When to seek medical advice  Call your healthcare provider right away if any of these occur:  · Vaginal bleeding  · Pain in your belly (abdomen) or back that is moderate or severe  · Lots of vomiting, or you cant keep any fluids down for 6 hours  · Burning feeling when you urinate  · Headache, dizziness, or rapid weight gain  · Fever  · Vision changes or blurred vision  Date Last Reviewed: 10/1/2016  © 2237-6663 [employer]. [address] [place] [address]. All rights reserved. This information is not intended as a substitute for professional medical care. Always follow your healthcare professional's instructions.        Nutrition During Pregnancy    Having a healthy baby depends mostly on you. What you eat matters to your baby and your health. During pregnancy, you will likely need about 300 more calories per day than before you became pregnant. Each day, try to eat the number of servings listed here for each food group. In addition, cut down on salt and caffeine. Limit the amount of sweets and high-fat foods you eat. Dont smoke or drink alcohol.  Important: See your healthcare provider as often as requested. If you have any questions, be sure to ask them.  Fruits  2 cups  Examples of 1-cup servings:  1 medium apple  1 medium orange  1 medium banana  1 cup chopped fruit  1 cup 100% fruit juice (pasteurized)  1/2 cup dried fruit Vegetables  2-1/2 to 3 cups   Examples of 1  servin cups raw, leafy greens  1 cup raw or cooked cut-up vegetables  1 cup 100% vegetable juice (pasteurized) Grains & Cereals*  6 to 8 ounces  Examples of 1-ounce servings:  1 slice bread  1/2 cup cooked rice  1/2 cup cooked cereal  1/2 cup pasta  1 ounce cold cereal Fats & Oils  6 to 8 teaspoons   Dairy**  3 cups  Examples of 1-cup servings:  1 cup milk  1 cup yogurt  1-1/2 ounces natural cheese  2 ounces processed cheese Protein---  5 to 6-1/2 ounces  Examples of 1-ounce servings:  1 egg  1 ounce of lean meat, poultry, or fish  1/4 cup cooked beans  1 tablespoon peanut butter  1/2 ounce nuts Fluids  8 or more 8-ounce glasses  Examples:  Water  Diluted juices: Apple, orange, cranberry  Mineral water  Clear soups, broth     *Note: Choose whole grains whenever possible.  ** Note: Try to choose low-fat options; avoid soft cheeses and unpasteurized milk.  --- Notes: Avoid raw or undercooked meats, eggs, and seafood. fish, and shellfish. Also, some types of fish, like shark, swordfish, and uyen mackerel should not be eaten during pregnancy. Avoid hot dogs, luncheon meats, and cold cuts unless heated to steaming just prior to being served. Ask your healthcare provider about safe choices.     Prenatal supplements  A prenatal supplement is a pill that you take daily during pregnancy. It helps make sure youre getting the right amount of certain nutrients that are important to your baby. Ask your healthcare provider to help you choose the best one for you. Important nutrients during pregnancy include:  · Folic acid. It's best to start taking this supplement 1 month before you start trying to get pregnant. Folic acid helps prevent certain problems in your baby. During pregnancy, you need to take 400 micrograms (mcg) of folic acid every day for the first 2 to 3 months after conception, and then 600 mcg is needed for growing fetus and placenta.  · Iron, calcium, and vitamin D. You may also be advised to take these  supplements during pregnancy. They help keep you and your baby healthy. Be sure to take them at different times because calcium makes it hard for the body to absorb iron. Taking iron with orange juice helps to increase its absorption.   Date Last Reviewed: 8/9/2015  © 3710-6111 [employer]. [address] [place] [address]. All rights reserved. This information is not intended as a substitute for professional medical care. Always follow your healthcare professional's instructions.

## 2017-10-25 NOTE — PROGRESS NOTES
New OB today  Consents signed  Last pap- more than 3 years- no hx abnormal pap  PN lab reviewed  US 10/20/17 at hospital secondary to hyperemesis- EDC 5/18/17- N/V stable with scopolamine patch and 3 anti- emetics. Ptyalism persists  Declines screening  Pap, GC/CMz today  Daily PN

## 2017-10-26 LAB
C TRACH DNA SPEC QL NAA+PROBE: NOT DETECTED
N GONORRHOEA DNA SPEC QL NAA+PROBE: NOT DETECTED

## 2017-11-10 ENCOUNTER — PATIENT MESSAGE (OUTPATIENT)
Dept: OBSTETRICS AND GYNECOLOGY | Facility: CLINIC | Age: 27
End: 2017-11-10

## 2017-11-18 ENCOUNTER — HOSPITAL ENCOUNTER (EMERGENCY)
Facility: HOSPITAL | Age: 27
Discharge: HOME | End: 2017-11-18
Attending: EMERGENCY MEDICINE
Payer: MEDICAID

## 2017-11-18 VITALS
WEIGHT: 216 LBS | RESPIRATION RATE: 18 BRPM | HEART RATE: 80 BPM | DIASTOLIC BLOOD PRESSURE: 70 MMHG | SYSTOLIC BLOOD PRESSURE: 112 MMHG | TEMPERATURE: 98 F | BODY MASS INDEX: 34.72 KG/M2 | OXYGEN SATURATION: 99 % | HEIGHT: 66 IN

## 2017-11-18 DIAGNOSIS — O21.0 HYPEREMESIS GRAVIDARUM: Primary | ICD-10-CM

## 2017-11-18 DIAGNOSIS — K59.00 CONSTIPATION, UNSPECIFIED CONSTIPATION TYPE: ICD-10-CM

## 2017-11-18 DIAGNOSIS — N30.00 ACUTE CYSTITIS WITHOUT HEMATURIA: ICD-10-CM

## 2017-11-18 LAB
ALBUMIN SERPL BCP-MCNC: 3.6 G/DL
ALP SERPL-CCNC: 57 U/L
ALT SERPL W/O P-5'-P-CCNC: 6 U/L
ANION GAP SERPL CALC-SCNC: 14 MMOL/L
AST SERPL-CCNC: 14 U/L
BACTERIA #/AREA URNS HPF: ABNORMAL /HPF
BASOPHILS # BLD AUTO: 0.01 K/UL
BASOPHILS NFR BLD: 0.2 %
BILIRUB SERPL-MCNC: 0.7 MG/DL
BILIRUB UR QL STRIP: NEGATIVE
BUN SERPL-MCNC: 9 MG/DL
CALCIUM SERPL-MCNC: 10 MG/DL
CHLORIDE SERPL-SCNC: 104 MMOL/L
CLARITY UR: ABNORMAL
CO2 SERPL-SCNC: 18 MMOL/L
COLOR UR: YELLOW
CREAT SERPL-MCNC: 0.7 MG/DL
DIFFERENTIAL METHOD: ABNORMAL
EOSINOPHIL # BLD AUTO: 0 K/UL
EOSINOPHIL NFR BLD: 0.5 %
ERYTHROCYTE [DISTWIDTH] IN BLOOD BY AUTOMATED COUNT: 12.4 %
EST. GFR  (AFRICAN AMERICAN): >60 ML/MIN/1.73 M^2
EST. GFR  (NON AFRICAN AMERICAN): >60 ML/MIN/1.73 M^2
GLUCOSE SERPL-MCNC: 75 MG/DL
GLUCOSE UR QL STRIP: NEGATIVE
HCT VFR BLD AUTO: 34.4 %
HGB BLD-MCNC: 12.2 G/DL
HGB UR QL STRIP: ABNORMAL
HYALINE CASTS #/AREA URNS LPF: 0 /LPF
KETONES UR QL STRIP: ABNORMAL
LEUKOCYTE ESTERASE UR QL STRIP: ABNORMAL
LIPASE SERPL-CCNC: 9 U/L
LYMPHOCYTES # BLD AUTO: 1.7 K/UL
LYMPHOCYTES NFR BLD: 27.4 %
MCH RBC QN AUTO: 32.4 PG
MCHC RBC AUTO-ENTMCNC: 35.5 G/DL
MCV RBC AUTO: 91 FL
MICROSCOPIC COMMENT: ABNORMAL
MONOCYTES # BLD AUTO: 0.4 K/UL
MONOCYTES NFR BLD: 6.9 %
NEUTROPHILS # BLD AUTO: 3.9 K/UL
NEUTROPHILS NFR BLD: 65 %
NITRITE UR QL STRIP: NEGATIVE
PH UR STRIP: 6 [PH] (ref 5–8)
PLATELET # BLD AUTO: 317 K/UL
PMV BLD AUTO: 9.6 FL
POTASSIUM SERPL-SCNC: 3.7 MMOL/L
PROT SERPL-MCNC: 8.4 G/DL
PROT UR QL STRIP: ABNORMAL
RBC # BLD AUTO: 3.77 M/UL
RBC #/AREA URNS HPF: 2 /HPF (ref 0–4)
SODIUM SERPL-SCNC: 136 MMOL/L
SP GR UR STRIP: 1.03 (ref 1–1.03)
SQUAMOUS #/AREA URNS HPF: 10 /HPF
URN SPEC COLLECT METH UR: ABNORMAL
UROBILINOGEN UR STRIP-ACNC: NEGATIVE EU/DL
WBC # BLD AUTO: 6.05 K/UL
WBC #/AREA URNS HPF: 40 /HPF (ref 0–5)
WBC CLUMPS URNS QL MICRO: ABNORMAL

## 2017-11-18 PROCEDURE — 80053 COMPREHEN METABOLIC PANEL: CPT

## 2017-11-18 PROCEDURE — 81000 URINALYSIS NONAUTO W/SCOPE: CPT

## 2017-11-18 PROCEDURE — 96361 HYDRATE IV INFUSION ADD-ON: CPT

## 2017-11-18 PROCEDURE — 85025 COMPLETE CBC W/AUTO DIFF WBC: CPT

## 2017-11-18 PROCEDURE — 96365 THER/PROPH/DIAG IV INF INIT: CPT

## 2017-11-18 PROCEDURE — 25000003 PHARM REV CODE 250: Performed by: EMERGENCY MEDICINE

## 2017-11-18 PROCEDURE — 99284 EMERGENCY DEPT VISIT MOD MDM: CPT | Mod: 25

## 2017-11-18 PROCEDURE — 83690 ASSAY OF LIPASE: CPT

## 2017-11-18 PROCEDURE — 63600175 PHARM REV CODE 636 W HCPCS: Performed by: EMERGENCY MEDICINE

## 2017-11-18 RX ORDER — ONDANSETRON 4 MG/1
4 TABLET, ORALLY DISINTEGRATING ORAL EVERY 8 HOURS PRN
Qty: 12 TABLET | Refills: 0 | Status: ON HOLD | OUTPATIENT
Start: 2017-11-18 | End: 2018-05-22 | Stop reason: HOSPADM

## 2017-11-18 RX ORDER — SCOLOPAMINE TRANSDERMAL SYSTEM 1 MG/1
1 PATCH, EXTENDED RELEASE TRANSDERMAL
Qty: 3 PATCH | Refills: 0 | Status: SHIPPED | OUTPATIENT
Start: 2017-11-18 | End: 2018-05-08

## 2017-11-18 RX ORDER — NITROFURANTOIN 25; 75 MG/1; MG/1
100 CAPSULE ORAL 2 TIMES DAILY
Qty: 10 CAPSULE | Refills: 0 | Status: SHIPPED | OUTPATIENT
Start: 2017-11-18 | End: 2017-11-23

## 2017-11-18 RX ORDER — SCOLOPAMINE TRANSDERMAL SYSTEM 1 MG/1
1 PATCH, EXTENDED RELEASE TRANSDERMAL
Status: COMPLETED | OUTPATIENT
Start: 2017-11-18 | End: 2017-11-18

## 2017-11-18 RX ORDER — POLYETHYLENE GLYCOL 3350 17 G/17G
17 POWDER, FOR SOLUTION ORAL DAILY
Qty: 24 PACKET | Refills: 0 | Status: SHIPPED | OUTPATIENT
Start: 2017-11-18 | End: 2018-04-30

## 2017-11-18 RX ADMIN — SCOPALAMINE 1 PATCH: 1 PATCH, EXTENDED RELEASE TRANSDERMAL at 12:11

## 2017-11-18 RX ADMIN — SODIUM CHLORIDE 1000 ML: 0.9 INJECTION, SOLUTION INTRAVENOUS at 09:11

## 2017-11-18 RX ADMIN — PROMETHAZINE HYDROCHLORIDE 12.5 MG: 25 INJECTION INTRAMUSCULAR; INTRAVENOUS at 09:11

## 2017-11-18 NOTE — ED NOTES
Pt reports she is 14 weeks pregnant and has not been able to keep liquids or solid food down for 3 days. Tried several prescribed meds without relief. Pt is followed by [facility].  Pt denies vaginal bleeding or discharge. Abd tender to pelvic area during palpation. Pt reports similar nausea and vomiting during previous 2 pregnancies. Pt also reports she hasn't had a BM x 5 days, but states she hasn't been able to eat for the last 3 days. Denies diarrhea or fever.

## 2017-11-18 NOTE — ED PROVIDER NOTES
SCRIBE #1 NOTE: I, Brook Carey, am scribing for, and in the presence of, David Vicente MD. I have scribed the entire note.      History      Chief Complaint   Patient presents with    Emesis     patient c/o N/V for the last 3 days, constipation last BM Tues, patient states she is 14 weeks preg, denies vaginal bleeding       Review of patient's allergies indicates:  No Known Allergies     HPI   HPI    11/18/2017, 9:40 AM   History obtained from the patient      History of Present Illness: Darryl Wall is a 27 y.o. female patient (14 weeks gestation) who presents to the Emergency Department for emesis which onset gradually 3 days ago. Symptoms are episodic and moderate in severity. Pt is intolerant to solids and liquids. Pt states that she has barely drank or eaten in the past few days. Pt notes last BM 5 days ago. No mitigating or exacerbating factors reported. Associated sxs include nausea and abd cramping. Abd cramping has subsided PTA. Patient denies any fever, diarrhea, abd distension, hematochezia, dysuria, hematuria, pelvic pain, vaginal bleeding, vaginal pain, vaginal discharge, and all other sxs at this time. No further complaints or concerns at this time.       Arrival mode: Personal vehicle    PCP: J Carlos Haney NP       Past Medical History:  History reviewed. No pertinent past medical history.    Past Surgical History:  Past surgical hx reviewed not relevant.     Family History:  Family History   Problem Relation Age of Onset    Breast cancer Maternal Grandmother        Social History:  Social History     Social History Main Topics    Smoking status: Never Smoker    Smokeless tobacco: Never Used    Alcohol use No    Drug use: No    Sexual activity: Yes     Partners: Male       ROS   Review of Systems   Constitutional: Negative for fever.   HENT: Negative for sore throat.    Respiratory: Negative for shortness of breath.    Cardiovascular: Negative for chest pain.   Gastrointestinal:  Positive for abdominal pain, nausea and vomiting. Negative for blood in stool, constipation and diarrhea.   Genitourinary: Negative for decreased urine volume, difficulty urinating, dysuria, flank pain, hematuria, pelvic pain, vaginal bleeding, vaginal discharge and vaginal pain.   Musculoskeletal: Negative for back pain.   Skin: Negative for rash.   Neurological: Negative for dizziness, weakness, light-headedness, numbness and headaches.   Hematological: Does not bruise/bleed easily.       Physical Exam      Initial Vitals [11/18/17 0909]   BP Pulse Resp Temp SpO2   (!) 144/74 106 18 98.2 °F (36.8 °C) 99 %      MAP       97.33          Physical Exam  Nursing Notes and Vital Signs Reviewed.  Constitutional: Patient is in no acute distress. Well-developed and well-nourished.  Head: Atraumatic. Normocephalic.  Eyes: PERRL. EOM intact. Conjunctivae are not pale. No scleral icterus.  ENT: Mucous membranes are moist. Oropharynx is clear and symmetric.    Neck: Supple. Full ROM. No lymphadenopathy.  Cardiovascular: Slightly tachycardic rate at 104. Regular rhythm. No murmurs, rubs, or gallops. Distal pulses are 2+ and symmetric.  Pulmonary/Chest: No respiratory distress. Clear to auscultation bilaterally. No wheezing or rales.  Abdominal: Soft and non-distended.  There is no tenderness.  No rebound, guarding, or rigidity. Good bowel sounds.  Genitourinary: No CVA tenderness  Musculoskeletal: Moves all extremities. No obvious deformities. No edema. No calf tenderness.  Skin: Warm and dry.  Neurological:  Alert, awake, and appropriate.  Normal speech.  No acute focal neurological deficits are appreciated.  Psychiatric: Normal affect. Good eye contact. Appropriate in content.    ED Course    Procedures  ED Vital Signs:  Vitals:    11/18/17 0909 11/18/17 1024 11/18/17 1229   BP: (!) 144/74 116/66 112/70   Pulse: 106 78 80   Resp: 18 16 18   Temp: 98.2 °F (36.8 °C)  98 °F (36.7 °C)   TempSrc:   Oral   SpO2: 99% 98% 99%  "  Weight: 98 kg (216 lb)     Height: 5' 6" (1.676 m)         Abnormal Lab Results:  Labs Reviewed   CBC W/ AUTO DIFFERENTIAL - Abnormal; Notable for the following:        Result Value    RBC 3.77 (*)     Hematocrit 34.4 (*)     MCH 32.4 (*)     All other components within normal limits   COMPREHENSIVE METABOLIC PANEL - Abnormal; Notable for the following:     CO2 18 (*)     ALT 6 (*)     All other components within normal limits   URINALYSIS - Abnormal; Notable for the following:     Appearance, UA Hazy (*)     Protein, UA 1+ (*)     Ketones, UA 3+ (*)     Occult Blood UA Trace (*)     Leukocytes, UA 2+ (*)     All other components within normal limits   URINALYSIS MICROSCOPIC - Abnormal; Notable for the following:     WBC, UA 40 (*)     Bacteria, UA Few (*)     All other components within normal limits   LIPASE        All Lab Results:  Results for orders placed or performed during the hospital encounter of 11/18/17   CBC W/ AUTO DIFFERENTIAL   Result Value Ref Range    WBC 6.05 3.90 - 12.70 K/uL    RBC 3.77 (L) 4.00 - 5.40 M/uL    Hemoglobin 12.2 12.0 - 16.0 g/dL    Hematocrit 34.4 (L) 37.0 - 48.5 %    MCV 91 82 - 98 fL    MCH 32.4 (H) 27.0 - 31.0 pg    MCHC 35.5 32.0 - 36.0 g/dL    RDW 12.4 11.5 - 14.5 %    Platelets 317 150 - 350 K/uL    MPV 9.6 9.2 - 12.9 fL    Gran # 3.9 1.8 - 7.7 K/uL    Lymph # 1.7 1.0 - 4.8 K/uL    Mono # 0.4 0.3 - 1.0 K/uL    Eos # 0.0 0.0 - 0.5 K/uL    Baso # 0.01 0.00 - 0.20 K/uL    Gran% 65.0 38.0 - 73.0 %    Lymph% 27.4 18.0 - 48.0 %    Mono% 6.9 4.0 - 15.0 %    Eosinophil% 0.5 0.0 - 8.0 %    Basophil% 0.2 0.0 - 1.9 %    Differential Method Automated    Comp. Metabolic Panel   Result Value Ref Range    Sodium 136 136 - 145 mmol/L    Potassium 3.7 3.5 - 5.1 mmol/L    Chloride 104 95 - 110 mmol/L    CO2 18 (L) 23 - 29 mmol/L    Glucose 75 70 - 110 mg/dL    BUN, Bld 9 6 - 20 mg/dL    Creatinine 0.7 0.5 - 1.4 mg/dL    Calcium 10.0 8.7 - 10.5 mg/dL    Total Protein 8.4 6.0 - 8.4 g/dL    " Albumin 3.6 3.5 - 5.2 g/dL    Total Bilirubin 0.7 0.1 - 1.0 mg/dL    Alkaline Phosphatase 57 55 - 135 U/L    AST 14 10 - 40 U/L    ALT 6 (L) 10 - 44 U/L    Anion Gap 14 8 - 16 mmol/L    eGFR if African American >60 >60 mL/min/1.73 m^2    eGFR if non African American >60 >60 mL/min/1.73 m^2   Lipase   Result Value Ref Range    Lipase 9 4 - 60 U/L   Urinalysis - Clean Catch   Result Value Ref Range    Specimen UA Urine, Clean Catch     Color, UA Yellow Yellow, Straw, [place]    Appearance, UA Hazy (A) Clear    pH, UA 6.0 5.0 - 8.0    Specific Gravity, UA 1.030 1.005 - 1.030    Protein, UA 1+ (A) Negative    Glucose, UA Negative Negative    Ketones, UA 3+ (A) Negative    Bilirubin (UA) Negative Negative    Occult Blood UA Trace (A) Negative    Nitrite, UA Negative Negative    Urobilinogen, UA Negative <2.0 EU/dL    Leukocytes, UA 2+ (A) Negative   Urinalysis Microscopic   Result Value Ref Range    RBC, UA 2 0 - 4 /hpf    WBC, UA 40 (H) 0 - 5 /hpf    WBC Clumps, UA Rare None-Rare    Bacteria, UA Few (A) None-Occ /hpf    Squam Epithel, UA 10 /hpf    Hyaline Casts, UA 0 0-1/lpf /lpf    Microscopic Comment SEE COMMENT             The Emergency Provider reviewed the vital signs and test results, which are outlined above.    ED Discussion     11:55 PM: Re-evaluated pt. Pt is resting comfortably and is in no acute distress.  Pt states that she is feeling better.  D/w pt all pertinent results. D/w pt any concerns expressed at this time. Answered all questions. Pt expresses understanding at this time.    12:01 PM: Dr. Vicente discussed the pt's case with Dr. White (OBGYN) who recommends Zofran and scopolamine patch.    12:05 PM: Reassessed pt at this time. Discussed with pt all pertinent ED information and results. Discussed pt dx and plan of tx. Gave pt all f/u and return to the ED instructions. All questions and concerns were addressed at this time. Pt expresses understanding of information and instructions, and is comfortable  with plan to discharge. Pt is stable for discharge.      ED Medication(s):  Medications   sodium chloride 0.9% bolus 1,000 mL (0 mLs Intravenous Stopped 11/18/17 1200)   promethazine (PHENERGAN) 12.5 mg in dextrose 5 % 50 mL IVPB (0 mg Intravenous Stopped 11/18/17 1023)   scopolamine 1.3-1.5 mg (1 mg over 3 days) 1 patch (1 patch Transdermal Patch Applied 11/18/17 1228)       Discharge Medication List as of 11/18/2017 12:15 PM      START taking these medications    Details   nitrofurantoin, macrocrystal-monohydrate, (MACROBID) 100 MG capsule Take 1 capsule (100 mg total) by mouth 2 (two) times daily., Starting Sat 11/18/2017, Until Thu 11/23/2017, Normal      ondansetron (ZOFRAN-ODT) 4 MG TbDL Take 1 tablet (4 mg total) by mouth every 8 (eight) hours as needed., Starting Sat 11/18/2017, Normal      polyethylene glycol (GLYCOLAX) 17 gram PwPk Take 17 g by mouth once daily., Starting Sat 11/18/2017, Normal      scopolamine (TRANSDERM-SCOP) 1.3-1.5 mg (1 mg over 3 days) Place 1 patch onto the skin every 72 hours., Starting Sat 11/18/2017, Normal             Follow-up Information     Halie Velasquez CNM In 2 days.    Specialty:  Obstetrics and Gynecology  Why:  Follow up with your primary doctor in the next 2 - 3 days as discussed.  Return to the emergency department for any worsening signs or symptoms.  Contact information:  [address]  [facility] [address] [phone]                     Medical Decision Making    Medical Decision Making:   Clinical Tests:   Lab Tests: Ordered and Reviewed           Scribe Attestation:   Scribe #1: I performed the above scribed service and the documentation accurately describes the services I performed. I attest to the accuracy of the note.    Attending:   Physician Attestation Statement for Scribe #1: I, David Vicente MD, personally performed the services described in this documentation, as scribed by Brook Carey, in my presence, and it is both accurate and complete.           Clinical Impression       ICD-10-CM ICD-9-CM   1. Hyperemesis gravidarum O21.0 643.00   2. Acute cystitis without hematuria N30.00 595.0   3. Constipation, unspecified constipation type K59.00 564.00       Disposition:   Disposition: Discharged  Condition: Stable         David Vicente MD  11/20/17 4137

## 2017-11-22 ENCOUNTER — ROUTINE PRENATAL (OUTPATIENT)
Dept: OBSTETRICS AND GYNECOLOGY | Facility: CLINIC | Age: 27
End: 2017-11-22
Payer: MEDICAID

## 2017-11-22 VITALS
BODY MASS INDEX: 34.09 KG/M2 | DIASTOLIC BLOOD PRESSURE: 68 MMHG | WEIGHT: 211.19 LBS | SYSTOLIC BLOOD PRESSURE: 122 MMHG

## 2017-11-22 DIAGNOSIS — Z34.02 PRIMIGRAVIDA IN SECOND TRIMESTER: ICD-10-CM

## 2017-11-22 DIAGNOSIS — O21.0 HYPEREMESIS GRAVIDARUM: ICD-10-CM

## 2017-11-22 PROCEDURE — 99213 OFFICE O/P EST LOW 20 MIN: CPT | Mod: PBBFAC,PO | Performed by: ADVANCED PRACTICE MIDWIFE

## 2017-11-22 PROCEDURE — 99999 PR PBB SHADOW E&M-EST. PATIENT-LVL III: CPT | Mod: PBBFAC,,, | Performed by: ADVANCED PRACTICE MIDWIFE

## 2017-11-22 PROCEDURE — 99213 OFFICE O/P EST LOW 20 MIN: CPT | Mod: TH,S$PBB,, | Performed by: ADVANCED PRACTICE MIDWIFE

## 2017-11-22 NOTE — PATIENT INSTRUCTIONS
Nutrition During Pregnancy    Having a healthy baby depends mostly on you. What you eat matters to your baby and your health. During pregnancy, you will likely need about 300 more calories per day than before you became pregnant. Each day, try to eat the number of servings listed here for each food group. In addition, cut down on salt and caffeine. Limit the amount of sweets and high-fat foods you eat. Dont smoke or drink alcohol.  Important: See your healthcare provider as often as requested. If you have any questions, be sure to ask them.  Fruits  2 cups  Examples of 1-cup servings:  1 medium apple  1 medium orange  1 medium banana  1 cup chopped fruit  1 cup 100% fruit juice (pasteurized)  1/2 cup dried fruit Vegetables  2-1/2 to 3 cups   Examples of 1 servin cups raw, leafy greens  1 cup raw or cooked cut-up vegetables  1 cup 100% vegetable juice (pasteurized) Grains & Cereals*  6 to 8 ounces  Examples of 1-ounce servings:  1 slice bread  1/2 cup cooked rice  1/2 cup cooked cereal  1/2 cup pasta  1 ounce cold cereal Fats & Oils  6 to 8 teaspoons   Dairy**  3 cups  Examples of 1-cup servings:  1 cup milk  1 cup yogurt  1-1/2 ounces natural cheese  2 ounces processed cheese Protein---  5 to 6-1/2 ounces  Examples of 1-ounce servings:  1 egg  1 ounce of lean meat, poultry, or fish  1/4 cup cooked beans  1 tablespoon peanut butter  1/2 ounce nuts Fluids  8 or more 8-ounce glasses  Examples:  Water  Diluted juices: Apple, orange, cranberry  Mineral water  Clear soups, broth     *Note: Choose whole grains whenever possible.  ** Note: Try to choose low-fat options; avoid soft cheeses and unpasteurized milk.  --- Notes: Avoid raw or undercooked meats, eggs, and seafood. fish, and shellfish. Also, some types of fish, like shark, swordfish, and uyen mackerel should not be eaten during pregnancy. Avoid hot dogs, luncheon meats, and cold cuts unless heated to steaming just prior to being served. Ask your healthcare  provider about safe choices.     Prenatal supplements  A prenatal supplement is a pill that you take daily during pregnancy. It helps make sure youre getting the right amount of certain nutrients that are important to your baby. Ask your healthcare provider to help you choose the best one for you. Important nutrients during pregnancy include:  · Folic acid. It's best to start taking this supplement 1 month before you start trying to get pregnant. Folic acid helps prevent certain problems in your baby. During pregnancy, you need to take 400 micrograms (mcg) of folic acid every day for the first 2 to 3 months after conception, and then 600 mcg is needed for growing fetus and placenta.  · Iron, calcium, and vitamin D. You may also be advised to take these supplements during pregnancy. They help keep you and your baby healthy. Be sure to take them at different times because calcium makes it hard for the body to absorb iron. Taking iron with orange juice helps to increase its absorption.   Date Last Reviewed: 8/9/2015  © 0130-0935 [employer]. [address] [place] [address], [place] [address]. All rights reserved. This information is not intended as a substitute for professional medical care. Always follow your healthcare professional's instructions.        Healthy Eating Habits During Pregnancy    Its important to develop healthy eating habits while you are pregnant, for you as well as for your baby. Here are some ways to stay healthy.  Aim for a healthy weight  A slow, steady rate of weight gain is often best. After the first trimester, you may gain about a pound a week. If you were overweight before pregnancy, you need to gain fewer pounds. Your healthcare provider can give you a healthy weight goal for your pregnancy.  Dont diet  Now is not the time to diet. You may not get enough of the nutrients you and your baby need. Instead, learn how to be a healthy eater. Start by doing it for your baby. Soon, you may  do it for yourself.  Vitamins and supplements  Talk with your healthcare provider about taking these and other prenatal vitamins and supplements.  · Iron makes the extra blood you need now.  · Calcium and vitamin D help build and keep strong bones.  · Folic acid helps prevent certain birth defects.  · Some vitamins may not be safe to take. Your healthcare provider will tell you which ones to avoid.  Fluids  Drink at least 8 to 10 cups of fluid daily. Your baby needs fluids. Fluids also decrease constipation, flush out toxins and waste, limit swelling, and help prevent bladder infections. Water is best. Other good choices are:  · Water or seltzer water with a slice of lemon or lime. (These can also help ease an upset stomach.)  · Clear soups that are low in salt  · Low-fat or fat-free milk; soy or rice milk with calcium added  · 100% fruit juices mixed with water  · Popsicles or gelatin  Things to avoid  Some things might harm your growing baby. Dont eat or drink:  · Alcohol  · Unpasteurized dairy foods and juices  · Raw or undercooked meat, poultry, fish, or eggs  · Prepared meats, like deli meats or hot dogs, unless heated until steaming hot  · Fish that are high in mercury, like shark, swordfish, uyen mackerel, tilefish, and albacore tuna   Things to limit  Ask your healthcare provider whether its safe to eat or drink:  · Caffeine  · Artificial sweeteners  · Organ meats  · Certain types of fish  · Fish and shellfish that contain mercury in lower amounts, like shrimp, canned light tuna, salmon, pollock, and catfish   Date Last Reviewed: 8/16/2015 © 2000-2017 [employer]. [address] [place] [address]. All rights reserved. This information is not intended as a substitute for professional medical care. Always follow your healthcare professional's instructions.

## 2017-11-22 NOTE — PROGRESS NOTES
GC/CMZ Negative  Hospital- meds given-advised to continue with meds given and FU per symptoms    Coffective counseling sheet Get Ready discussed with mother. Reinforced avoiding induction of labor unless medically indicated as well as comfort measures during labor.  Encouraged mother to download Coffective mobile esha if she has not already done so. Mother verbalizes understanding.

## 2017-12-26 ENCOUNTER — PROCEDURE VISIT (OUTPATIENT)
Dept: OBSTETRICS AND GYNECOLOGY | Facility: CLINIC | Age: 27
End: 2017-12-26
Payer: MEDICAID

## 2017-12-26 ENCOUNTER — ROUTINE PRENATAL (OUTPATIENT)
Dept: OBSTETRICS AND GYNECOLOGY | Facility: CLINIC | Age: 27
End: 2017-12-26
Payer: MEDICAID

## 2017-12-26 VITALS — DIASTOLIC BLOOD PRESSURE: 74 MMHG | WEIGHT: 213 LBS | SYSTOLIC BLOOD PRESSURE: 120 MMHG | BODY MASS INDEX: 34.38 KG/M2

## 2017-12-26 DIAGNOSIS — Z36.89 ENCOUNTER FOR FETAL ANATOMIC SURVEY: Primary | ICD-10-CM

## 2017-12-26 DIAGNOSIS — N91.1 AMENORRHEA, SECONDARY: ICD-10-CM

## 2017-12-26 DIAGNOSIS — Z36.89 ENCOUNTER FOR FETAL ANATOMIC SURVEY: ICD-10-CM

## 2017-12-26 DIAGNOSIS — O21.9 VOMITING PREGNANCY: ICD-10-CM

## 2017-12-26 DIAGNOSIS — J30.2 ACUTE SEASONAL ALLERGIC RHINITIS, UNSPECIFIED TRIGGER: ICD-10-CM

## 2017-12-26 PROCEDURE — 76805 OB US >/= 14 WKS SNGL FETUS: CPT | Mod: PBBFAC,PO | Performed by: OBSTETRICS & GYNECOLOGY

## 2017-12-26 PROCEDURE — 99999 PR PBB SHADOW E&M-EST. PATIENT-LVL II: CPT | Mod: PBBFAC,,, | Performed by: ADVANCED PRACTICE MIDWIFE

## 2017-12-26 PROCEDURE — 99213 OFFICE O/P EST LOW 20 MIN: CPT | Mod: TH,S$PBB,, | Performed by: ADVANCED PRACTICE MIDWIFE

## 2017-12-26 PROCEDURE — 76805 OB US >/= 14 WKS SNGL FETUS: CPT | Mod: 26,S$PBB,, | Performed by: OBSTETRICS & GYNECOLOGY

## 2017-12-26 PROCEDURE — 99212 OFFICE O/P EST SF 10 MIN: CPT | Mod: PBBFAC,PO | Performed by: ADVANCED PRACTICE MIDWIFE

## 2017-12-26 RX ORDER — METOCLOPRAMIDE 10 MG/1
10 TABLET ORAL EVERY 6 HOURS PRN
Qty: 60 TABLET | Refills: 3 | Status: SHIPPED | OUTPATIENT
Start: 2017-12-26 | End: 2018-04-30

## 2017-12-26 RX ORDER — LORATADINE 10 MG/1
10 TABLET ORAL DAILY
Qty: 90 TABLET | Refills: 3 | Status: SHIPPED | OUTPATIENT
Start: 2017-12-26 | End: 2018-04-30

## 2017-12-26 RX ORDER — PROMETHAZINE HYDROCHLORIDE 25 MG/1
25 TABLET ORAL EVERY 6 HOURS PRN
Qty: 40 TABLET | Refills: 3 | Status: SHIPPED | OUTPATIENT
Start: 2017-12-26 | End: 2018-04-10 | Stop reason: SDUPTHER

## 2017-12-26 NOTE — PROGRESS NOTES
Doing ok, but having problems with headaches and ptyalism.   Scopolamine patch did not help, rec hard candy, gum, ice  Headaches: increase water and tylenol as directed. Can take excedrin migraine if no improvement.   C/o sinus congestion and drainage, OTC meds recommended.  US: baby girl # 3. Posterior placenta, 3VC, normal fluid, normal anatomy suboptimal spine. Will rescan next visit. Cervical length 31.2 mm transabdominally.     Coffective counseling sheet Learn Your Baby discussed with mother. Instructed regarding feeding cues and methods to calm baby. Encouraged mother to download Coffective mobile esha if she has not already done so.  Mother verbalized understanding.

## 2018-01-25 ENCOUNTER — ROUTINE PRENATAL (OUTPATIENT)
Dept: OBSTETRICS AND GYNECOLOGY | Facility: CLINIC | Age: 28
End: 2018-01-25
Payer: MEDICAID

## 2018-01-25 ENCOUNTER — PROCEDURE VISIT (OUTPATIENT)
Dept: OBSTETRICS AND GYNECOLOGY | Facility: CLINIC | Age: 28
End: 2018-01-25
Payer: MEDICAID

## 2018-01-25 VITALS — SYSTOLIC BLOOD PRESSURE: 115 MMHG | DIASTOLIC BLOOD PRESSURE: 74 MMHG | BODY MASS INDEX: 36.15 KG/M2 | WEIGHT: 224 LBS

## 2018-01-25 DIAGNOSIS — Z34.02 PRIMIGRAVIDA IN SECOND TRIMESTER: Primary | ICD-10-CM

## 2018-01-25 DIAGNOSIS — Z36.89 ENCOUNTER FOR FETAL ANATOMIC SURVEY: ICD-10-CM

## 2018-01-25 DIAGNOSIS — Z34.90 NORMAL INTRAUTERINE PREGNANCY, ANTEPARTUM: ICD-10-CM

## 2018-01-25 PROCEDURE — 99212 OFFICE O/P EST SF 10 MIN: CPT | Mod: PBBFAC,TH,PO | Performed by: ADVANCED PRACTICE MIDWIFE

## 2018-01-25 PROCEDURE — 76816 OB US FOLLOW-UP PER FETUS: CPT | Mod: 26,S$PBB,, | Performed by: OBSTETRICS & GYNECOLOGY

## 2018-01-25 PROCEDURE — 99213 OFFICE O/P EST LOW 20 MIN: CPT | Mod: TH,S$PBB,, | Performed by: ADVANCED PRACTICE MIDWIFE

## 2018-01-25 PROCEDURE — 76816 OB US FOLLOW-UP PER FETUS: CPT | Mod: PBBFAC,PO | Performed by: OBSTETRICS & GYNECOLOGY

## 2018-01-25 PROCEDURE — 99999 PR PBB SHADOW E&M-EST. PATIENT-LVL II: CPT | Mod: PBBFAC,,, | Performed by: ADVANCED PRACTICE MIDWIFE

## 2018-01-25 NOTE — PATIENT INSTRUCTIONS
Adapting to Pregnancy: Second Trimester  Keep up the healthy habits you started in your first trimester. You might be a little more tired than normal. So plan your day wisely. Look at the tips below and choose the ones that suit your lifestyle.    If you have any questions, check with your healthcare provider.   If you work  If you can, adjust your work with your employer to fit your needs. Try these tips:  · If you stand for long periods, find ways to do some tasks while sitting. Also, try to stand with 1 foot resting on a low stool or ledge. Shift your weight from foot to foot often. Wear low-heeled shoes.  · If you sit, keep your knees level with your hips. Rest your feet on a firm surface. Sit tall with support for your low back.  · If you work long hours, ask about adjusting your schedule. Try taking shorter breaks more often.  When you travel  The second trimester may be the best time for any travel. Talk to your healthcare provider about any special plans you may need to make. Always:  · Wear a seat belt. Fasten the lap part under your belly. Wear the shoulder part also.  · Take breaks often during long trips by car or plane. Move around to stretch your legs.  · Drink plenty of fluids on flights. The air in plane cabins is very dry.  · Avoid hot climates or high altitudes if you are not used to them.  · Avoid places where the food and water might make you sick.  · Make sure you are up-to-date on all immunizations, including the flu vaccine. This is especially important when traveling overseas.  Taking time to relax  Find time to rest and relax at work or at home:  · Take short time-outs daily. Do relaxation exercises.  · Breathe deeply during stressful times.  · Try not to take on too much. Plan tasks for times when you have the most energy.  · Take naps when you can. Or just sit and relax.  · After week 16, avoid lying on your back for more than a few minutes. Instead, lie on your side. Switch sides  often.  Continuing as lovers  Unless your healthcare provider tells you otherwise, there is no reason to stop having sex now. Blood supply increases to the pelvic area in the second trimester. Because of this, sex might be more enjoyable. Try different positions and see whats best. Also, talk to your partner about any changes in desire. Spotting may happen after sex. Be sure to let your healthcare provider know if there is heavy bleeding.  Keeping your environment safe  You can still clean house and use scented products. Just take some simple precautions:  · Wear gloves when using cleaning fluids.  · Open windows to let in fresh air. Use a fan if you paint.  · Avoid secondhand smoke.  · Dont breathe fumes from nail polish, hair spray, cleansers, or other chemicals.  Date Last Reviewed: 8/16/2015 © 2000-2017 "[employer]". [address] [place] [address]. All rights reserved. This information is not intended as a substitute for professional medical care. Always follow your healthcare professional's instructions.        Pregnancy: Your Second Trimester Changes    Each day, you and your baby are changing and growing together. Heres a quick look at whats happening to both of you.  How You Are Changing  Even when you dont notice it, your body is adapting to meet the needs of your growing baby. The changes in your body might also affect your moods.  Your body  Your uterus expands as baby grows. As the weeks go by, you will feel more pressure on your bladder, stomach, and other organs. You may notice some skin color changes on your forehead, nose, or cheeks. Freckles may darken, and moles may grow. You may notice a darker line on your abdomen between your belly button and pubic bone in the midline.  Your moods  The second trimester is often easier than the first. Still, be prepared for mood swings. These are due to the increase in hormones (chemicals that affect the way organs work) produced by  your body. These mood swings are a normal part of pregnancy.  How your baby is growing       Month 4  Babys heartbeat may be heard with a Doppler (hand-held ultrasound device) by 9 to 10 weeks. Eyebrows, eyelashes and fingernails begin to form.  Month 5  You may feel your baby move. After a growth spurt, your baby nears 10 inches. Month 6  Babys fingerprints have formed. Your baby weighs about 1  to 2 pounds and is about 12 inches long.   Date Last Reviewed: 8/16/2015  © 9076-0456 [employer]. [address] [place] [address], [place] [address]. All rights reserved. This information is not intended as a substitute for professional medical care. Always follow your healthcare professional's instructions.

## 2018-01-25 NOTE — PROGRESS NOTES
C/o tightness in lower abdomen, discussed round ligament pain- comfort measures and maternity belt  Ptyalism- hard candy, gum, ice not helping  Headaches- using excedrin migraine but not helping  US- FHR 148bpm, cephalic presentation, posterior placenta. EFW 1lb 5oz, 34%. Structure of spine/skeleton appear normal. Anatomy scan complete   Considering tdap- handout provided  Declines flu   28 week labs next visit      Coffective counseling sheet Fall In Love discussed with mother. Reinforced immediate skin to skin, the magic first hour, importance of the first feeding and delaying routine procedures. Encouraged mother to download Coffective mobile esha if she has not already done so. Mother verbalizes understanding.    Coffective counseling sheet Nourish discussed with mother. Reinforced basic breastfeeding position and latch as well as proper hand expression technique. Encouraged mother to download Coffective mobile esha if she has not already done so.  Mother verbalizes understanding.

## 2018-02-08 ENCOUNTER — LAB VISIT (OUTPATIENT)
Dept: LAB | Facility: HOSPITAL | Age: 28
End: 2018-02-08
Attending: ADVANCED PRACTICE MIDWIFE
Payer: MEDICAID

## 2018-02-08 DIAGNOSIS — Z34.02 PRIMIGRAVIDA IN SECOND TRIMESTER: ICD-10-CM

## 2018-02-08 LAB
BASOPHILS # BLD AUTO: 0.02 K/UL
BASOPHILS NFR BLD: 0.3 %
DIFFERENTIAL METHOD: ABNORMAL
EOSINOPHIL # BLD AUTO: 0.1 K/UL
EOSINOPHIL NFR BLD: 1 %
ERYTHROCYTE [DISTWIDTH] IN BLOOD BY AUTOMATED COUNT: 12.8 %
GLUCOSE SERPL-MCNC: 77 MG/DL
HCT VFR BLD AUTO: 29 %
HGB BLD-MCNC: 9.2 G/DL
IMM GRANULOCYTES # BLD AUTO: 0.02 K/UL
IMM GRANULOCYTES NFR BLD AUTO: 0.3 %
LYMPHOCYTES # BLD AUTO: 1.9 K/UL
LYMPHOCYTES NFR BLD: 25 %
MCH RBC QN AUTO: 31.7 PG
MCHC RBC AUTO-ENTMCNC: 31.7 G/DL
MCV RBC AUTO: 100 FL
MONOCYTES # BLD AUTO: 0.5 K/UL
MONOCYTES NFR BLD: 6.6 %
NEUTROPHILS # BLD AUTO: 5.2 K/UL
NEUTROPHILS NFR BLD: 66.8 %
NRBC BLD-RTO: 0 /100 WBC
PLATELET # BLD AUTO: 289 K/UL
PMV BLD AUTO: 11.2 FL
RBC # BLD AUTO: 2.9 M/UL
WBC # BLD AUTO: 7.72 K/UL

## 2018-02-08 PROCEDURE — 36415 COLL VENOUS BLD VENIPUNCTURE: CPT | Mod: PO

## 2018-02-08 PROCEDURE — 85025 COMPLETE CBC W/AUTO DIFF WBC: CPT

## 2018-02-08 PROCEDURE — 86592 SYPHILIS TEST NON-TREP QUAL: CPT

## 2018-02-08 PROCEDURE — 86703 HIV-1/HIV-2 1 RESULT ANTBDY: CPT

## 2018-02-08 PROCEDURE — 82950 GLUCOSE TEST: CPT

## 2018-02-09 LAB
HIV 1+2 AB+HIV1 P24 AG SERPL QL IA: NEGATIVE
RPR SER QL: NORMAL

## 2018-02-22 ENCOUNTER — ROUTINE PRENATAL (OUTPATIENT)
Dept: OBSTETRICS AND GYNECOLOGY | Facility: CLINIC | Age: 28
End: 2018-02-22
Payer: MEDICAID

## 2018-02-22 VITALS
SYSTOLIC BLOOD PRESSURE: 114 MMHG | DIASTOLIC BLOOD PRESSURE: 78 MMHG | WEIGHT: 223.56 LBS | BODY MASS INDEX: 36.08 KG/M2

## 2018-02-22 DIAGNOSIS — O21.0 HYPEREMESIS GRAVIDARUM: Primary | ICD-10-CM

## 2018-02-22 DIAGNOSIS — Z34.90 NORMAL INTRAUTERINE PREGNANCY, ANTEPARTUM: ICD-10-CM

## 2018-02-22 PROCEDURE — 99999 PR PBB SHADOW E&M-EST. PATIENT-LVL I: CPT | Mod: PBBFAC,,, | Performed by: ADVANCED PRACTICE MIDWIFE

## 2018-02-22 PROCEDURE — 99211 OFF/OP EST MAY X REQ PHY/QHP: CPT | Mod: PBBFAC,TH,PO,25 | Performed by: ADVANCED PRACTICE MIDWIFE

## 2018-02-22 PROCEDURE — 3008F BODY MASS INDEX DOCD: CPT | Mod: ,,, | Performed by: ADVANCED PRACTICE MIDWIFE

## 2018-02-22 PROCEDURE — 90471 IMMUNIZATION ADMIN: CPT | Mod: PBBFAC,PO

## 2018-02-22 PROCEDURE — 99213 OFFICE O/P EST LOW 20 MIN: CPT | Mod: TH,S$PBB,, | Performed by: ADVANCED PRACTICE MIDWIFE

## 2018-02-22 NOTE — PROGRESS NOTES
Persistent N/V with ptyalism Advise  Promethazine 12.5mg q6hr    Anemia- FE supplement with OJ  Tdap today  Passed glucose    Coffective counseling sheet Keep Baby Close discussed with mother. Reinforced rooming in practices, continued skin to skin, and quiet hours as requested by mother.  Encouraged mother to download Coffective mobile esha if she has not already done so. Mother verbalizes understanding.

## 2018-03-01 ENCOUNTER — TELEPHONE (OUTPATIENT)
Dept: OBSTETRICS AND GYNECOLOGY | Facility: CLINIC | Age: 28
End: 2018-03-01

## 2018-03-01 NOTE — TELEPHONE ENCOUNTER
----- Message from Melanie Garcia sent at 3/1/2018  8:34 AM CST -----  Contact: Pt  Pt request a call from the nurse to get proof of pregnancy fax to [phone], please contact the pt at [phone]

## 2018-03-05 ENCOUNTER — ROUTINE PRENATAL (OUTPATIENT)
Dept: OBSTETRICS AND GYNECOLOGY | Facility: CLINIC | Age: 28
End: 2018-03-05
Payer: MEDICAID

## 2018-03-05 VITALS
DIASTOLIC BLOOD PRESSURE: 60 MMHG | WEIGHT: 228.38 LBS | BODY MASS INDEX: 36.86 KG/M2 | SYSTOLIC BLOOD PRESSURE: 118 MMHG

## 2018-03-05 DIAGNOSIS — Z34.90 NORMAL INTRAUTERINE PREGNANCY, ANTEPARTUM: Primary | ICD-10-CM

## 2018-03-05 PROCEDURE — 99999 PR PBB SHADOW E&M-EST. PATIENT-LVL II: CPT | Mod: PBBFAC,,, | Performed by: ADVANCED PRACTICE MIDWIFE

## 2018-03-05 PROCEDURE — 99213 OFFICE O/P EST LOW 20 MIN: CPT | Mod: TH,S$PBB,, | Performed by: ADVANCED PRACTICE MIDWIFE

## 2018-03-05 PROCEDURE — 99212 OFFICE O/P EST SF 10 MIN: CPT | Mod: PBBFAC,TH,PO | Performed by: ADVANCED PRACTICE MIDWIFE

## 2018-03-05 NOTE — PATIENT INSTRUCTIONS
Adapting to Pregnancy: Third Trimester    Although common during pregnancy, some discomforts may seem worse in the final weeks. Simple lifestyle changes can help. Take care of yourself. And ask your partner to help out with small tasks.  Limiting leg problems  Ways to combat leg issues:  · Wear support hose all day.  · Avoid snug shoes and clothes that bind, like tight pants and socks with elastic tops.  · Sit with your feet and legs raised often.  Caring for your breasts  Tips to follow include:  · Wash with plain water. Avoid using harsh soaps or rubbing alcohol. They may cause dryness.  · Wear a nursing bra for extra support. It can also hide any leaks from your nipples.  Controlling hemorrhoids  Ways to avoid hemorrhoids include:  · Eat foods that are high in fiber. Also, exercise and drink enough fluids. This will reduce constipation and hemorrhoids.  · Sleep and nap on your side. This limits pressure on the veins of your rectum.  · Try not to stand or sit for long periods.  Controlling back pain  As your body changes during pregnancy, your back must work in new ways. Back pain is due to many causes. Physical changes in your body can strain your back and its supporting muscles. Also, hormones (chemicals that carry messages throughout the body) increase during pregnancy. This can affect how your muscles and joints work together. All of these changes can lead to pain. Pain may be felt in the upper or lower back. Pain is also common in the pelvis. Some pregnant women have sciatica. This is pain caused by pressure on the sciatic nerve running down the back of the leg. Ask your healthcare provider for specific tips and exercises to help control your back pain.  Tips to help you rest  Good rest and sleep will help you feel better. Here are some ideas:  · Ask your partner to massage your shoulders, neck, or back.  · Limit the errands you do each day.  · Lie down in the afternoon or after work for a few  minutes.  · Take a warm bath before you go to sleep.  · Drink warm milk or teas without caffeine.  · Avoid coffee, black tea, and cola.  Stopping heartburn  · Avoid spicy or acidic foods.  · Eat small amounts more often. Eat slowly. · Wait 2 hours after eating before lying down.  · Sleep with your upper body raised 6 inches.   Managing mood swings  Ways to manage mood swings include:  · Know that mood changes are normal.  · Exercise often, but get plenty of rest.  · Address any concerns and limit stress. Talking to your partner, other women, or your healthcare provider may help.  Dealing with urinary frequency  Tips to deal with having to urinate often include:  · Drink plenty of water all day. If you drink a lot in the evening, though, you may have to get up more in the night.  · Limit coffee, black tea, and cola.  Date Last Reviewed: 8/16/2015  © 6907-1613 [employer]. [address] [place] [address]. All rights reserved. This information is not intended as a substitute for professional medical care. Always follow your healthcare professional's instructions.        Pregnancy: Your Third Trimester Changes  As the baby grows, your body changes too. You may also see signs that your body is getting ready for labor. Be patient. Within a few more weeks, your baby will be born.    How you are changing  Your body is preparing for the birth of your baby. Some of the most common changes are listed below. If you have any questions or concerns, ask your healthcare provider:  · Youll gain more weight from fluids, extra blood, and fat deposits.  · Your breasts will grow as your body gets ready to feed the baby. They may be more tender. You may also notice a slight yellow or white discharge from the nipples.  · Discharge from your vagina may increase. This is normal.  · You might see some skin color changes on your forehead, cheeks, or nose. Most of these will go away after you deliver.  How your baby is  growing    Month 7  Your baby can open and close his or her eyes, and weighs around 4 pounds. If born prematurely (too early), your baby would likely survive with special care.   Month 8  Your baby is building up body fat, and weighs around 6 pounds.    Month 9  Your baby weighs nearly 7 pounds and is about 18 to 20 inches long. In other words, any day now...   Date Last Reviewed: 2015  © 8260-2004 [employer]. [address] [place] [address]. All rights reserved. This information is not intended as a substitute for professional medical care. Always follow your healthcare professional's instructions.        Tubal Sterilization Surgery    Tubal sterilization is one of the most effective forms of birth control. It blocks the egg from being fertilized by sperm. This prevents pregnancy. The surgery can be an outpatient procedure or done after the birth of a child. It can be done at the time of delivery if you are having a  section.   Making the decision  If you have tubal sterilization, you most likely will never get pregnant again. Be sure thats what you want. Talk it over with your healthcare provider and your partner. Surgery to undo tubal sterilization is complicated. It is costly. And it is not always successful. So, think of tubal sterilization as a lifelong birth control choice.   Closing the tubes  Your healthcare provider will choose the best way to block your tubes. Tubes may be closed with heat (cauterization). They may be closed with a clip or ring. Or they may be tied off and cut.  Your surgery  Your healthcare provider will tell you your choices for how the surgery will be done. There are several choices for surgical sterilization. Your healthcare provider will also discuss with you the complete removal of the tubes as an added benefit to decreasing ovarian cancer risk.     Possible laparoscopy incision sites          Minilaparotomy incision sites        Laparoscopy  This surgery is done without a hospital stay. For the procedure, a laparoscope is used. This is a thin tube with a camera and a light on the end. The healthcare provider makes 1 to 2 small incisions in the stomach. The scope is put through 1 of the incisions. The scope sends live pictures of your fallopian tubes to a video screen. Surgical tools are placed through the other small incisions. Using the live images, the healthcare provider blocks your tubes. All tools are removed. The incisions are then closed with sutures or staples.   Minilaparotomy  A small incision is made near the navel or at the pubic hairline. This surgery is often done right after childbirth. An incision is made just underneath the belly button. Just after childbirth your uterus is enlarged, so it is easier to locate the tubes from an incision near the navel than from an incision near the pubic bone. The healthcare provider works through this incision to block the tubes. The incision is then closed with sutures or staples. After a  delivery, the sterilization can be done through the existing incision.   Hysteroscopy   This surgery can be done in your healthcare provider's office with sedation to keep you comfortable. A speculum is placed in the vagina and a thin tube with a camera and a light on the end is passed through the cervix into the uterus. Small coils are then placed into the fallopian tubes. It takes 3 months for the tubes to form scars over the coils and block the passage of sperm. You will need to use another method of contraception during these 3 months. Then a test called an HSG (hysterosalpingogram) is done to confirm the tubes are blocked.  Date Last Reviewed: 2016-2017 [employer]. [address] [place] [address], [place] [address]. All rights reserved. This information is not intended as a substitute for professional medical care. Always follow your healthcare professional's  instructions.

## 2018-03-21 ENCOUNTER — ROUTINE PRENATAL (OUTPATIENT)
Dept: OBSTETRICS AND GYNECOLOGY | Facility: CLINIC | Age: 28
End: 2018-03-21
Payer: MEDICAID

## 2018-03-21 VITALS
BODY MASS INDEX: 36.94 KG/M2 | SYSTOLIC BLOOD PRESSURE: 126 MMHG | WEIGHT: 228.81 LBS | DIASTOLIC BLOOD PRESSURE: 72 MMHG

## 2018-03-21 DIAGNOSIS — Z34.90 NORMAL INTRAUTERINE PREGNANCY, ANTEPARTUM: Primary | ICD-10-CM

## 2018-03-21 PROCEDURE — 99212 OFFICE O/P EST SF 10 MIN: CPT | Mod: PBBFAC,TH,PO | Performed by: ADVANCED PRACTICE MIDWIFE

## 2018-03-21 PROCEDURE — 99213 OFFICE O/P EST LOW 20 MIN: CPT | Mod: TH,S$PBB,, | Performed by: ADVANCED PRACTICE MIDWIFE

## 2018-03-21 PROCEDURE — 99999 PR PBB SHADOW E&M-EST. PATIENT-LVL II: CPT | Mod: PBBFAC,,, | Performed by: ADVANCED PRACTICE MIDWIFE

## 2018-03-21 RX ORDER — FERROUS SULFATE 324(65)MG
325 TABLET, DELAYED RELEASE (ENTERIC COATED) ORAL DAILY
Status: ON HOLD | COMMUNITY
End: 2018-05-22 | Stop reason: HOSPADM

## 2018-03-21 NOTE — PATIENT INSTRUCTIONS
Pregnancy and Childbirth: What to Bring to the Hospital    Youre likely feeling anxious as your childs birth approaches. This is normal. To give yourself some peace of mind, pack a bag ahead of time. Do this about 1 month ahead of your estimated delivery date. Here is a list of things to remember:  · Personal care items, like a toothbrush, hair brush, lip balm, lotion, and shampoo  · Eyeglasses (if you wear them)  · Nightgown (if you plan to breastfeed, pack 1 that allows for nursing)  · Nursing bra if you plan to breastfeed  · Bathrobe and slippers  · Many hospitals provide maternity underwear, but you may want to bring underwear that can be soiled because you will have bleeding after delivery  · Comfortable clothes for you to wear home, like sweat pants, yoga pants, or other stretchable clothes, because your prepregnancy clothes may not fit after delivery of your baby  · Clothes for your baby to wear home  · Personal music player and headphones  · Camera with new batteries or   · Coins for vending machines  · Telephone numbers of people to call after the birth  · Cell phone and   · Insurance information and any other paperwork needed for your hospital stay  · A list of baby names you are considering  · An infant, rear-facing car seat for bringing home your baby (this is required by law)  Add anything else that you dont want to forget:  _____________________________________  _____________________________________  _____________________________________  _____________________________________  _____________________________________  _____________________________________  _____________________________________  Date Last Reviewed: 8/7/2015  © 5330-8106 [employer]. [address] [place] [address]. All rights reserved. This information is not intended as a substitute for professional medical care. Always follow your healthcare professional's instructions.        Adapting to  Pregnancy: Third Trimester    Although common during pregnancy, some discomforts may seem worse in the final weeks. Simple lifestyle changes can help. Take care of yourself. And ask your partner to help out with small tasks.  Limiting leg problems  Ways to combat leg issues:  · Wear support hose all day.  · Avoid snug shoes and clothes that bind, like tight pants and socks with elastic tops.  · Sit with your feet and legs raised often.  Caring for your breasts  Tips to follow include:  · Wash with plain water. Avoid using harsh soaps or rubbing alcohol. They may cause dryness.  · Wear a nursing bra for extra support. It can also hide any leaks from your nipples.  Controlling hemorrhoids  Ways to avoid hemorrhoids include:  · Eat foods that are high in fiber. Also, exercise and drink enough fluids. This will reduce constipation and hemorrhoids.  · Sleep and nap on your side. This limits pressure on the veins of your rectum.  · Try not to stand or sit for long periods.  Controlling back pain  As your body changes during pregnancy, your back must work in new ways. Back pain is due to many causes. Physical changes in your body can strain your back and its supporting muscles. Also, hormones (chemicals that carry messages throughout the body) increase during pregnancy. This can affect how your muscles and joints work together. All of these changes can lead to pain. Pain may be felt in the upper or lower back. Pain is also common in the pelvis. Some pregnant women have sciatica. This is pain caused by pressure on the sciatic nerve running down the back of the leg. Ask your healthcare provider for specific tips and exercises to help control your back pain.  Tips to help you rest  Good rest and sleep will help you feel better. Here are some ideas:  · Ask your partner to massage your shoulders, neck, or back.  · Limit the errands you do each day.  · Lie down in the afternoon or after work for a few minutes.  · Take a warm  bath before you go to sleep.  · Drink warm milk or teas without caffeine.  · Avoid coffee, black tea, and cola.  Stopping heartburn  · Avoid spicy or acidic foods.  · Eat small amounts more often. Eat slowly. · Wait 2 hours after eating before lying down.  · Sleep with your upper body raised 6 inches.   Managing mood swings  Ways to manage mood swings include:  · Know that mood changes are normal.  · Exercise often, but get plenty of rest.  · Address any concerns and limit stress. Talking to your partner, other women, or your healthcare provider may help.  Dealing with urinary frequency  Tips to deal with having to urinate often include:  · Drink plenty of water all day. If you drink a lot in the evening, though, you may have to get up more in the night.  · Limit coffee, black tea, and cola.  Date Last Reviewed: 8/16/2015  © 3129-5909 [employer]. [address] [place] [address]. All rights reserved. This information is not intended as a substitute for professional medical care. Always follow your healthcare professional's instructions.        Pregnancy: Your Third Trimester Changes  As the baby grows, your body changes too. You may also see signs that your body is getting ready for labor. Be patient. Within a few more weeks, your baby will be born.    How you are changing  Your body is preparing for the birth of your baby. Some of the most common changes are listed below. If you have any questions or concerns, ask your healthcare provider:  · Youll gain more weight from fluids, extra blood, and fat deposits.  · Your breasts will grow as your body gets ready to feed the baby. They may be more tender. You may also notice a slight yellow or white discharge from the nipples.  · Discharge from your vagina may increase. This is normal.  · You might see some skin color changes on your forehead, cheeks, or nose. Most of these will go away after you deliver.  How your baby is growing    Month  7  Your baby can open and close his or her eyes, and weighs around 4 pounds. If born prematurely (too early), your baby would likely survive with special care.   Month 8  Your baby is building up body fat, and weighs around 6 pounds.    Month 9  Your baby weighs nearly 7 pounds and is about 18 to 20 inches long. In other words, any day now...   Date Last Reviewed: 8/16/2015 © 2000-2017 [employer]. [address] [place] [address], [place] [address]. All rights reserved. This information is not intended as a substitute for professional medical care. Always follow your healthcare professional's instructions.

## 2018-04-02 ENCOUNTER — ROUTINE PRENATAL (OUTPATIENT)
Dept: OBSTETRICS AND GYNECOLOGY | Facility: CLINIC | Age: 28
End: 2018-04-02
Payer: MEDICAID

## 2018-04-02 ENCOUNTER — PROCEDURE VISIT (OUTPATIENT)
Dept: OBSTETRICS AND GYNECOLOGY | Facility: CLINIC | Age: 28
End: 2018-04-02
Payer: MEDICAID

## 2018-04-02 VITALS — BODY MASS INDEX: 36.15 KG/M2 | DIASTOLIC BLOOD PRESSURE: 70 MMHG | SYSTOLIC BLOOD PRESSURE: 120 MMHG | WEIGHT: 224 LBS

## 2018-04-02 DIAGNOSIS — Z34.90 NORMAL INTRAUTERINE PREGNANCY, ANTEPARTUM: Primary | ICD-10-CM

## 2018-04-02 DIAGNOSIS — Z34.90 NORMAL INTRAUTERINE PREGNANCY, ANTEPARTUM: ICD-10-CM

## 2018-04-02 DIAGNOSIS — D50.9 IRON DEFICIENCY ANEMIA, UNSPECIFIED IRON DEFICIENCY ANEMIA TYPE: ICD-10-CM

## 2018-04-02 PROCEDURE — 76820 UMBILICAL ARTERY ECHO: CPT | Mod: PBBFAC,PO | Performed by: OBSTETRICS & GYNECOLOGY

## 2018-04-02 PROCEDURE — 76816 OB US FOLLOW-UP PER FETUS: CPT | Mod: 26,59,S$PBB, | Performed by: OBSTETRICS & GYNECOLOGY

## 2018-04-02 PROCEDURE — 76820 UMBILICAL ARTERY ECHO: CPT | Mod: 26,S$PBB,, | Performed by: OBSTETRICS & GYNECOLOGY

## 2018-04-02 PROCEDURE — 99213 OFFICE O/P EST LOW 20 MIN: CPT | Mod: PBBFAC,25,TH,PO | Performed by: ADVANCED PRACTICE MIDWIFE

## 2018-04-02 PROCEDURE — 76819 FETAL BIOPHYS PROFIL W/O NST: CPT | Mod: 26,S$PBB,, | Performed by: OBSTETRICS & GYNECOLOGY

## 2018-04-02 PROCEDURE — 99999 PR PBB SHADOW E&M-EST. PATIENT-LVL III: CPT | Mod: PBBFAC,,, | Performed by: ADVANCED PRACTICE MIDWIFE

## 2018-04-02 PROCEDURE — 76816 OB US FOLLOW-UP PER FETUS: CPT | Mod: 59,PBBFAC,PO | Performed by: OBSTETRICS & GYNECOLOGY

## 2018-04-02 PROCEDURE — 76819 FETAL BIOPHYS PROFIL W/O NST: CPT | Mod: PBBFAC,PO | Performed by: OBSTETRICS & GYNECOLOGY

## 2018-04-02 PROCEDURE — 99213 OFFICE O/P EST LOW 20 MIN: CPT | Mod: TH,S$PBB,, | Performed by: ADVANCED PRACTICE MIDWIFE

## 2018-04-02 NOTE — PROGRESS NOTES
"US Vtx, Posterior placenta, MVP 7.7cm, SURI  26.7cm  EFW 3.14ozs, 8%  BPP 8/8, Umbilical artery s/d ratio elevated near placenta with no absent or reversal flow seen  Weight gain, ptyalism, N/V, correct way of taking meds for efficacy and "grazing" diet discussed  Desires Mirena- paperwork today    Coffective counseling sheet Get Ready discussed with mother. Reinforced avoiding induction of labor unless medically indicated as well as comfort measures during labor.  Encouraged mother to download Coffective mobile esha if she has not already done so. Mother verbalizes understanding.  "

## 2018-04-02 NOTE — PATIENT INSTRUCTIONS
What Is Group B Strep?  Group B strep (streptococcus) is a common bacteria. It can grow in a womans vagina, rectum, or urinary tract. It is almost always harmless in adults. But in rare cases, a woman who has group B strep can infect her baby during the birth. Infection can cause serious illness in the . The good news is that treating the mother during labor reduces the risk of the baby becoming infected. And if a  is infected, the infection can be treated.  Facts about group B strep  Learning more about group B strep can help you understand how testing and treatment can help. Here are some basic facts about group B strep:  · It is not a sexually transmitted disease.  · It is not the same as strep throat. (This is caused by group A strep.)  · It often has no symptoms and may cause no problems in adults.  · Test results can be misleading. They may be positive one week and negative the next week.  · Group B strep can be transmitted during vaginal delivery. It cannot be passed during  (surgical) birth.  · A mother with group B strep rarely infects her . (Infection happens only about 1% to 2% of the time.)  · When a mother is treated during labor and delivery, her baby almost never becomes infected.  · Certain factors during pregnancy increase the risk of a baby becoming infected.  Possible effects on your baby  Group B strep can infect the blood. It can also cause inflammation of the babys lungs, brain, or spinal cord. Long-term effects can include blindness, deafness, mental retardation, or cerebral palsy. And in rare cases, infection causes death. Infection is most often found soon after the baby is born.    How your baby may become infected  Group B strep often lives in the vagina or rectum. If the amniotic sac breaks early, bacteria from the vagina can travel to the uterus, reaching the baby. Or, as the baby passes through the birth canal, it can come in contact with the bacteria.  In rare cases, group B strep can also be passed to the baby after delivery. This is called late-onset group B strep. The source of this type of infection is not well understood. But some experts believe that it happens if the baby is exposed to group B strep in the home, from the parents or siblings, or in the community.  What increases the risk?  Certain risk factors increase the chance that a baby will be infected. They include:  · Breaking or leaking of the amniotic sac earlier than 37 weeks gestation  · Labor earlier than 37 weeks gestation  · Breaking of the amniotic sac more than 18 hours before labor begins  · Fever during labor  · A urinary tract infection with group B strep at any point in the pregnancy  · A previous baby born with a group B strep infection  Date Last Reviewed: 6/1/2016  © 1482-9197 [employer]. [address] [place] [address], [place] [address]. All rights reserved. This information is not intended as a substitute for professional medical care. Always follow your healthcare professional's instructions.

## 2018-04-03 ENCOUNTER — TELEPHONE (OUTPATIENT)
Dept: PHARMACY | Facility: CLINIC | Age: 28
End: 2018-04-03

## 2018-04-03 DIAGNOSIS — O21.9 VOMITING PREGNANCY: ICD-10-CM

## 2018-04-04 RX ORDER — PROMETHAZINE HYDROCHLORIDE 25 MG/1
25 TABLET ORAL EVERY 6 HOURS PRN
Qty: 40 TABLET | Refills: 3
Start: 2018-04-04

## 2018-04-04 NOTE — TELEPHONE ENCOUNTER
Bailey Velasquez, JESUS; Ron Ambrose Staff 15 hours ago (4:27 PM)      Patient's Mirena is covered at this time with a $0.00 copay. Is it acceptable to postpone delivery of the device until patient delivers? I see her PRAKASH is 6/2/18.     Thank you,   Bailey Newton   Patient Care Advocate   [facility]   Ph: [phone]   (Routing comment)

## 2018-04-10 DIAGNOSIS — O21.9 VOMITING PREGNANCY: ICD-10-CM

## 2018-04-10 RX ORDER — PROMETHAZINE HYDROCHLORIDE 25 MG/1
25 TABLET ORAL EVERY 6 HOURS PRN
Qty: 40 TABLET | Refills: 3 | Status: ON HOLD | OUTPATIENT
Start: 2018-04-10 | End: 2018-05-22 | Stop reason: HOSPADM

## 2018-04-16 DIAGNOSIS — O21.9 VOMITING PREGNANCY: ICD-10-CM

## 2018-04-17 ENCOUNTER — LAB VISIT (OUTPATIENT)
Dept: LAB | Facility: HOSPITAL | Age: 28
End: 2018-04-17
Attending: ADVANCED PRACTICE MIDWIFE
Payer: MEDICAID

## 2018-04-17 ENCOUNTER — ROUTINE PRENATAL (OUTPATIENT)
Dept: OBSTETRICS AND GYNECOLOGY | Facility: CLINIC | Age: 28
End: 2018-04-17
Payer: MEDICAID

## 2018-04-17 VITALS
BODY MASS INDEX: 37.29 KG/M2 | SYSTOLIC BLOOD PRESSURE: 120 MMHG | WEIGHT: 231.06 LBS | DIASTOLIC BLOOD PRESSURE: 78 MMHG

## 2018-04-17 DIAGNOSIS — Z34.90 NORMAL INTRAUTERINE PREGNANCY, ANTEPARTUM: ICD-10-CM

## 2018-04-17 DIAGNOSIS — Z34.90 NORMAL INTRAUTERINE PREGNANCY, ANTEPARTUM: Primary | ICD-10-CM

## 2018-04-17 LAB
BASOPHILS # BLD AUTO: 0.02 K/UL
BASOPHILS NFR BLD: 0.2 %
DIFFERENTIAL METHOD: ABNORMAL
EOSINOPHIL # BLD AUTO: 0.1 K/UL
EOSINOPHIL NFR BLD: 0.8 %
ERYTHROCYTE [DISTWIDTH] IN BLOOD BY AUTOMATED COUNT: 13.2 %
HCT VFR BLD AUTO: 31.6 %
HGB BLD-MCNC: 9.9 G/DL
IMM GRANULOCYTES # BLD AUTO: 0.05 K/UL
IMM GRANULOCYTES NFR BLD AUTO: 0.6 %
LYMPHOCYTES # BLD AUTO: 1.7 K/UL
LYMPHOCYTES NFR BLD: 20 %
MCH RBC QN AUTO: 31.3 PG
MCHC RBC AUTO-ENTMCNC: 31.3 G/DL
MCV RBC AUTO: 100 FL
MONOCYTES # BLD AUTO: 0.9 K/UL
MONOCYTES NFR BLD: 10 %
NEUTROPHILS # BLD AUTO: 5.8 K/UL
NEUTROPHILS NFR BLD: 68.4 %
NRBC BLD-RTO: 0 /100 WBC
PLATELET # BLD AUTO: 325 K/UL
PMV BLD AUTO: 10.3 FL
RBC # BLD AUTO: 3.16 M/UL
WBC # BLD AUTO: 8.48 K/UL

## 2018-04-17 PROCEDURE — 99212 OFFICE O/P EST SF 10 MIN: CPT | Mod: PBBFAC,TH,PO | Performed by: ADVANCED PRACTICE MIDWIFE

## 2018-04-17 PROCEDURE — 87081 CULTURE SCREEN ONLY: CPT

## 2018-04-17 PROCEDURE — 36415 COLL VENOUS BLD VENIPUNCTURE: CPT | Mod: PO

## 2018-04-17 PROCEDURE — 99999 PR PBB SHADOW E&M-EST. PATIENT-LVL II: CPT | Mod: PBBFAC,,, | Performed by: ADVANCED PRACTICE MIDWIFE

## 2018-04-17 PROCEDURE — 85025 COMPLETE CBC W/AUTO DIFF WBC: CPT

## 2018-04-17 PROCEDURE — 99213 OFFICE O/P EST LOW 20 MIN: CPT | Mod: TH,S$PBB,, | Performed by: ADVANCED PRACTICE MIDWIFE

## 2018-04-17 RX ORDER — PROMETHAZINE HYDROCHLORIDE 25 MG/1
25 TABLET ORAL EVERY 6 HOURS PRN
Qty: 40 TABLET | Refills: 3 | OUTPATIENT
Start: 2018-04-17

## 2018-04-17 NOTE — PROGRESS NOTES
Vomiting still persisting along with ptyalism but Darryl finding a way to navigate  With diet and Phenergan 2 pound weight gain noted since last visit= 13 pounds total  Anemia- Check CBC today  GBS today  Mirena  Breast/bottle    Coffective counseling sheet Get Ready discussed with mother. Reinforced avoiding induction of labor unless medically indicated as well as comfort measures during labor.  Encouraged mother to download Coffective mobile esha if she has not already done so. Mother verbalizes understanding.

## 2018-04-19 LAB — BACTERIA SPEC AEROBE CULT: NORMAL

## 2018-04-30 ENCOUNTER — PROCEDURE VISIT (OUTPATIENT)
Dept: OBSTETRICS AND GYNECOLOGY | Facility: CLINIC | Age: 28
End: 2018-04-30
Payer: MEDICAID

## 2018-04-30 ENCOUNTER — LAB VISIT (OUTPATIENT)
Dept: LAB | Facility: HOSPITAL | Age: 28
End: 2018-04-30
Attending: ADVANCED PRACTICE MIDWIFE
Payer: MEDICAID

## 2018-04-30 ENCOUNTER — ROUTINE PRENATAL (OUTPATIENT)
Dept: OBSTETRICS AND GYNECOLOGY | Facility: CLINIC | Age: 28
End: 2018-04-30
Payer: MEDICAID

## 2018-04-30 VITALS — SYSTOLIC BLOOD PRESSURE: 124 MMHG | BODY MASS INDEX: 37.5 KG/M2 | WEIGHT: 232.38 LBS | DIASTOLIC BLOOD PRESSURE: 72 MMHG

## 2018-04-30 DIAGNOSIS — D64.9 ANEMIA, UNSPECIFIED TYPE: ICD-10-CM

## 2018-04-30 DIAGNOSIS — Z34.90 NORMAL INTRAUTERINE PREGNANCY, ANTEPARTUM: ICD-10-CM

## 2018-04-30 DIAGNOSIS — Z36.89 ENCOUNTER FOR ULTRASOUND TO CHECK FETAL GROWTH: ICD-10-CM

## 2018-04-30 DIAGNOSIS — Z34.90 NORMAL INTRAUTERINE PREGNANCY, ANTEPARTUM: Primary | ICD-10-CM

## 2018-04-30 LAB
BASOPHILS # BLD AUTO: 0.03 K/UL
BASOPHILS NFR BLD: 0.4 %
DIFFERENTIAL METHOD: ABNORMAL
EOSINOPHIL # BLD AUTO: 0.1 K/UL
EOSINOPHIL NFR BLD: 0.7 %
ERYTHROCYTE [DISTWIDTH] IN BLOOD BY AUTOMATED COUNT: 13.2 %
HCT VFR BLD AUTO: 30.2 %
HGB BLD-MCNC: 9.6 G/DL
IMM GRANULOCYTES # BLD AUTO: 0.04 K/UL
IMM GRANULOCYTES NFR BLD AUTO: 0.5 %
LYMPHOCYTES # BLD AUTO: 1.9 K/UL
LYMPHOCYTES NFR BLD: 22.1 %
MCH RBC QN AUTO: 31.7 PG
MCHC RBC AUTO-ENTMCNC: 31.8 G/DL
MCV RBC AUTO: 100 FL
MONOCYTES # BLD AUTO: 0.8 K/UL
MONOCYTES NFR BLD: 9.8 %
NEUTROPHILS # BLD AUTO: 5.6 K/UL
NEUTROPHILS NFR BLD: 66.5 %
NRBC BLD-RTO: 0 /100 WBC
PLATELET # BLD AUTO: 324 K/UL
PMV BLD AUTO: 10.1 FL
RBC # BLD AUTO: 3.03 M/UL
WBC # BLD AUTO: 8.38 K/UL

## 2018-04-30 PROCEDURE — 85025 COMPLETE CBC W/AUTO DIFF WBC: CPT

## 2018-04-30 PROCEDURE — 99999 PR PBB SHADOW E&M-EST. PATIENT-LVL II: CPT | Mod: PBBFAC,,, | Performed by: ADVANCED PRACTICE MIDWIFE

## 2018-04-30 PROCEDURE — 99213 OFFICE O/P EST LOW 20 MIN: CPT | Mod: TH,S$PBB,, | Performed by: ADVANCED PRACTICE MIDWIFE

## 2018-04-30 PROCEDURE — 76819 FETAL BIOPHYS PROFIL W/O NST: CPT | Mod: 26,S$PBB,, | Performed by: OBSTETRICS & GYNECOLOGY

## 2018-04-30 PROCEDURE — 76819 FETAL BIOPHYS PROFIL W/O NST: CPT | Mod: PBBFAC,PO | Performed by: OBSTETRICS & GYNECOLOGY

## 2018-04-30 PROCEDURE — 99212 OFFICE O/P EST SF 10 MIN: CPT | Mod: PBBFAC,TH,PO,25 | Performed by: ADVANCED PRACTICE MIDWIFE

## 2018-04-30 PROCEDURE — 36415 COLL VENOUS BLD VENIPUNCTURE: CPT | Mod: PO

## 2018-05-08 ENCOUNTER — ROUTINE PRENATAL (OUTPATIENT)
Dept: OBSTETRICS AND GYNECOLOGY | Facility: CLINIC | Age: 28
End: 2018-05-08
Payer: MEDICAID

## 2018-05-08 VITALS
BODY MASS INDEX: 37.82 KG/M2 | WEIGHT: 234.38 LBS | SYSTOLIC BLOOD PRESSURE: 122 MMHG | DIASTOLIC BLOOD PRESSURE: 74 MMHG

## 2018-05-08 DIAGNOSIS — Z34.90 NORMAL INTRAUTERINE PREGNANCY, ANTEPARTUM: Primary | ICD-10-CM

## 2018-05-08 DIAGNOSIS — Z36.89 ENCOUNTER FOR ULTRASOUND TO CHECK FETAL GROWTH: ICD-10-CM

## 2018-05-08 PROCEDURE — 99213 OFFICE O/P EST LOW 20 MIN: CPT | Mod: TH,S$PBB,, | Performed by: ADVANCED PRACTICE MIDWIFE

## 2018-05-08 PROCEDURE — 99212 OFFICE O/P EST SF 10 MIN: CPT | Mod: PBBFAC,TH,PO | Performed by: ADVANCED PRACTICE MIDWIFE

## 2018-05-08 PROCEDURE — 99999 PR PBB SHADOW E&M-EST. PATIENT-LVL II: CPT | Mod: PBBFAC,,, | Performed by: ADVANCED PRACTICE MIDWIFE

## 2018-05-08 NOTE — PROGRESS NOTES
US from last visit- EFW 5.14ozs, MVP 8.3, SURI 23.1, BPP 8/8 Growth 12% - good interval growth noted  Ready for labor    Coffective counseling sheet Nourish discussed with mother. Reinforced basic breastfeeding position and latch as well as proper hand expression technique. Encouraged mother to download Coffective mobile esha if she has not already done so.  Mother verbalizes understanding.    Coffective counseling sheet Build Your Team discussed with mother. Reinforced importance of early identification of support team including champion, OB provider, pediatrician and local community resources. Encouraged mother to download Coffective mobile esha if she has not already done so.  Mother verbalizes understanding.

## 2018-05-15 ENCOUNTER — ROUTINE PRENATAL (OUTPATIENT)
Dept: OBSTETRICS AND GYNECOLOGY | Facility: CLINIC | Age: 28
End: 2018-05-15
Payer: MEDICAID

## 2018-05-15 VITALS
DIASTOLIC BLOOD PRESSURE: 74 MMHG | WEIGHT: 240.75 LBS | SYSTOLIC BLOOD PRESSURE: 126 MMHG | BODY MASS INDEX: 38.86 KG/M2

## 2018-05-15 DIAGNOSIS — Z34.80 ENCOUNTER FOR SUPERVISION OF NORMAL PREGNANCY IN MULTIGRAVIDA: Primary | ICD-10-CM

## 2018-05-15 DIAGNOSIS — O48.0 POST-TERM PREGNANCY, 40-42 WEEKS OF GESTATION: ICD-10-CM

## 2018-05-15 PROCEDURE — 99999 PR PBB SHADOW E&M-EST. PATIENT-LVL II: CPT | Mod: PBBFAC,,, | Performed by: ADVANCED PRACTICE MIDWIFE

## 2018-05-15 PROCEDURE — 99212 OFFICE O/P EST SF 10 MIN: CPT | Mod: PBBFAC,TH,PO | Performed by: ADVANCED PRACTICE MIDWIFE

## 2018-05-15 PROCEDURE — 99213 OFFICE O/P EST LOW 20 MIN: CPT | Mod: TH,S$PBB,, | Performed by: ADVANCED PRACTICE MIDWIFE

## 2018-05-15 NOTE — PROGRESS NOTES
Doing ok, ready for baby.  Labor talk, fetal kick counts.   Ptyalism and occasional vomiting still occur.  US next week for post term. IOL @ 41 weeks if no spontaneous labor.

## 2018-05-20 ENCOUNTER — ANESTHESIA EVENT (OUTPATIENT)
Dept: OBSTETRICS AND GYNECOLOGY | Facility: HOSPITAL | Age: 28
End: 2018-05-20
Payer: MEDICAID

## 2018-05-20 ENCOUNTER — ANESTHESIA (OUTPATIENT)
Dept: OBSTETRICS AND GYNECOLOGY | Facility: HOSPITAL | Age: 28
End: 2018-05-20
Payer: MEDICAID

## 2018-05-20 ENCOUNTER — HOSPITAL ENCOUNTER (INPATIENT)
Facility: HOSPITAL | Age: 28
LOS: 2 days | Discharge: HOME | End: 2018-05-22
Attending: OBSTETRICS & GYNECOLOGY | Admitting: OBSTETRICS & GYNECOLOGY
Payer: MEDICAID

## 2018-05-20 DIAGNOSIS — O48.0 POST-DATES PREGNANCY: ICD-10-CM

## 2018-05-20 PROBLEM — O21.0 HYPEREMESIS GRAVIDARUM: Status: RESOLVED | Noted: 2017-10-18 | Resolved: 2018-05-20

## 2018-05-20 PROBLEM — Z36.89 ENCOUNTER FOR ULTRASOUND TO CHECK FETAL GROWTH: Status: RESOLVED | Noted: 2018-04-30 | Resolved: 2018-05-20

## 2018-05-20 PROBLEM — O42.90 AMNIOTIC FLUID LEAKING: Status: ACTIVE | Noted: 2018-05-20

## 2018-05-20 PROBLEM — Z34.90 NORMAL INTRAUTERINE PREGNANCY, ANTEPARTUM: Status: RESOLVED | Noted: 2018-01-25 | Resolved: 2018-05-20

## 2018-05-20 PROBLEM — O42.90 AMNIOTIC FLUID LEAKING: Status: RESOLVED | Noted: 2018-05-20 | Resolved: 2018-05-20

## 2018-05-20 LAB
ABO + RH BLD: NORMAL
AMPHET+METHAMPHET UR QL: NEGATIVE
BARBITURATES UR QL SCN>200 NG/ML: NEGATIVE
BASOPHILS # BLD AUTO: 0.01 K/UL
BASOPHILS NFR BLD: 0.2 %
BENZODIAZ UR QL SCN>200 NG/ML: NEGATIVE
BLD GP AB SCN CELLS X3 SERPL QL: NORMAL
BZE UR QL SCN: NEGATIVE
CANNABINOIDS UR QL SCN: NORMAL
CREAT UR-MCNC: 169.4 MG/DL
DIFFERENTIAL METHOD: ABNORMAL
EOSINOPHIL # BLD AUTO: 0.1 K/UL
EOSINOPHIL NFR BLD: 0.9 %
ERYTHROCYTE [DISTWIDTH] IN BLOOD BY AUTOMATED COUNT: 13.9 %
HCT VFR BLD AUTO: 27.6 %
HGB BLD-MCNC: 9.1 G/DL
LYMPHOCYTES # BLD AUTO: 1.5 K/UL
LYMPHOCYTES NFR BLD: 22.4 %
MCH RBC QN AUTO: 31.1 PG
MCHC RBC AUTO-ENTMCNC: 33 G/DL
MCV RBC AUTO: 94 FL
METHADONE UR QL SCN>300 NG/ML: NEGATIVE
MONOCYTES # BLD AUTO: 0.7 K/UL
MONOCYTES NFR BLD: 10.8 %
NEUTROPHILS # BLD AUTO: 4.4 K/UL
NEUTROPHILS NFR BLD: 65.7 %
OPIATES UR QL SCN: NEGATIVE
PCP UR QL SCN>25 NG/ML: NEGATIVE
PLATELET # BLD AUTO: 301 K/UL
PMV BLD AUTO: 9.1 FL
RBC # BLD AUTO: 2.93 M/UL
TOXICOLOGY INFORMATION: NORMAL
WBC # BLD AUTO: 6.65 K/UL

## 2018-05-20 PROCEDURE — 72200005 HC VAGINAL DELIVERY LEVEL II

## 2018-05-20 PROCEDURE — 59409 OBSTETRICAL CARE: CPT | Mod: GB,,, | Performed by: ADVANCED PRACTICE MIDWIFE

## 2018-05-20 PROCEDURE — 62326 NJX INTERLAMINAR LMBR/SAC: CPT | Performed by: ANESTHESIOLOGY

## 2018-05-20 PROCEDURE — 25000003 PHARM REV CODE 250: Performed by: ADVANCED PRACTICE MIDWIFE

## 2018-05-20 PROCEDURE — 27800517 HC TRAY,EPIDURAL-CONTINUOUS: Performed by: NURSE ANESTHETIST, CERTIFIED REGISTERED

## 2018-05-20 PROCEDURE — 63600175 PHARM REV CODE 636 W HCPCS: Performed by: ANESTHESIOLOGY

## 2018-05-20 PROCEDURE — 51701 INSERT BLADDER CATHETER: CPT

## 2018-05-20 PROCEDURE — 80307 DRUG TEST PRSMV CHEM ANLYZR: CPT

## 2018-05-20 PROCEDURE — 63600175 PHARM REV CODE 636 W HCPCS: Performed by: ADVANCED PRACTICE MIDWIFE

## 2018-05-20 PROCEDURE — 11000001 HC ACUTE MED/SURG PRIVATE ROOM

## 2018-05-20 PROCEDURE — 72100002 HC LABOR CARE, 1ST 8 HOURS

## 2018-05-20 PROCEDURE — 85025 COMPLETE CBC W/AUTO DIFF WBC: CPT

## 2018-05-20 PROCEDURE — 86850 RBC ANTIBODY SCREEN: CPT

## 2018-05-20 RX ORDER — ROPIVACAINE HYDROCHLORIDE 2 MG/ML
INJECTION, SOLUTION EPIDURAL; INFILTRATION; PERINEURAL CONTINUOUS PRN
Status: DISCONTINUED | OUTPATIENT
Start: 2018-05-20 | End: 2018-05-20

## 2018-05-20 RX ORDER — ONDANSETRON 8 MG/1
8 TABLET, ORALLY DISINTEGRATING ORAL EVERY 8 HOURS PRN
Status: DISCONTINUED | OUTPATIENT
Start: 2018-05-20 | End: 2018-05-22 | Stop reason: HOSPADM

## 2018-05-20 RX ORDER — IBUPROFEN 600 MG/1
600 TABLET ORAL EVERY 6 HOURS
Status: DISCONTINUED | OUTPATIENT
Start: 2018-05-20 | End: 2018-05-22 | Stop reason: HOSPADM

## 2018-05-20 RX ORDER — DIPHENHYDRAMINE HCL 25 MG
25 CAPSULE ORAL EVERY 4 HOURS PRN
Status: DISCONTINUED | OUTPATIENT
Start: 2018-05-20 | End: 2018-05-22 | Stop reason: HOSPADM

## 2018-05-20 RX ORDER — OXYTOCIN/RINGER'S LACTATE 20/1000 ML
2 PLASTIC BAG, INJECTION (ML) INTRAVENOUS CONTINUOUS
Status: DISCONTINUED | OUTPATIENT
Start: 2018-05-20 | End: 2018-05-20

## 2018-05-20 RX ORDER — OXYTOCIN/RINGER'S LACTATE 20/1000 ML
2 PLASTIC BAG, INJECTION (ML) INTRAVENOUS CONTINUOUS
Status: CANCELLED | OUTPATIENT
Start: 2018-05-20 | End: 2018-05-20

## 2018-05-20 RX ORDER — ONDANSETRON 8 MG/1
8 TABLET, ORALLY DISINTEGRATING ORAL EVERY 8 HOURS PRN
Status: DISCONTINUED | OUTPATIENT
Start: 2018-05-20 | End: 2018-05-20

## 2018-05-20 RX ORDER — FERROUS SULFATE 325(65) MG
325 TABLET, DELAYED RELEASE (ENTERIC COATED) ORAL DAILY
Status: DISCONTINUED | OUTPATIENT
Start: 2018-05-21 | End: 2018-05-22 | Stop reason: HOSPADM

## 2018-05-20 RX ORDER — ROPIVACAINE IN 0.9% SOD CHL/PF 0.2 %
PLASTIC BAG, INJECTION (ML) EPIDURAL CONTINUOUS
Status: CANCELLED | OUTPATIENT
Start: 2018-05-20

## 2018-05-20 RX ORDER — SODIUM CHLORIDE 9 MG/ML
INJECTION, SOLUTION INTRAVENOUS
Status: DISCONTINUED | OUTPATIENT
Start: 2018-05-20 | End: 2018-05-20

## 2018-05-20 RX ORDER — SODIUM CHLORIDE, SODIUM LACTATE, POTASSIUM CHLORIDE, CALCIUM CHLORIDE 600; 310; 30; 20 MG/100ML; MG/100ML; MG/100ML; MG/100ML
INJECTION, SOLUTION INTRAVENOUS CONTINUOUS
Status: DISCONTINUED | OUTPATIENT
Start: 2018-05-20 | End: 2018-05-20

## 2018-05-20 RX ORDER — HYDROCODONE BITARTRATE AND ACETAMINOPHEN 7.5; 325 MG/1; MG/1
1 TABLET ORAL EVERY 4 HOURS PRN
Status: DISCONTINUED | OUTPATIENT
Start: 2018-05-20 | End: 2018-05-22 | Stop reason: HOSPADM

## 2018-05-20 RX ORDER — DOCUSATE SODIUM 100 MG/1
100 CAPSULE, LIQUID FILLED ORAL DAILY
Status: DISCONTINUED | OUTPATIENT
Start: 2018-05-21 | End: 2018-05-22 | Stop reason: HOSPADM

## 2018-05-20 RX ORDER — ACETAMINOPHEN 325 MG/1
650 TABLET ORAL EVERY 6 HOURS PRN
Status: DISCONTINUED | OUTPATIENT
Start: 2018-05-20 | End: 2018-05-22 | Stop reason: HOSPADM

## 2018-05-20 RX ORDER — HYDROCORTISONE 25 MG/G
CREAM TOPICAL 3 TIMES DAILY PRN
Status: DISCONTINUED | OUTPATIENT
Start: 2018-05-20 | End: 2018-05-22 | Stop reason: HOSPADM

## 2018-05-20 RX ORDER — AMMONIA 15 % (W/V)
0.3 AMPUL (EA) INHALATION CONTINUOUS PRN
Status: DISCONTINUED | OUTPATIENT
Start: 2018-05-20 | End: 2018-05-22 | Stop reason: HOSPADM

## 2018-05-20 RX ORDER — HYDROCODONE BITARTRATE AND ACETAMINOPHEN 5; 325 MG/1; MG/1
1 TABLET ORAL EVERY 4 HOURS PRN
Status: DISCONTINUED | OUTPATIENT
Start: 2018-05-20 | End: 2018-05-22 | Stop reason: HOSPADM

## 2018-05-20 RX ORDER — CALCIUM CARBONATE 200(500)MG
500 TABLET,CHEWABLE ORAL DAILY PRN
Status: DISCONTINUED | OUTPATIENT
Start: 2018-05-20 | End: 2018-05-20

## 2018-05-20 RX ORDER — ROPIVACAINE HYDROCHLORIDE 2 MG/ML
INJECTION, SOLUTION EPIDURAL; INFILTRATION; PERINEURAL
Status: DISCONTINUED | OUTPATIENT
Start: 2018-05-20 | End: 2018-05-20

## 2018-05-20 RX ADMIN — SODIUM CHLORIDE, SODIUM LACTATE, POTASSIUM CHLORIDE, AND CALCIUM CHLORIDE: .6; .31; .03; .02 INJECTION, SOLUTION INTRAVENOUS at 02:05

## 2018-05-20 RX ADMIN — ROPIVACAINE HYDROCHLORIDE 3 ML: 2 INJECTION, SOLUTION EPIDURAL; INFILTRATION at 12:05

## 2018-05-20 RX ADMIN — Medication 2 MILLI-UNITS/MIN: at 10:05

## 2018-05-20 RX ADMIN — SODIUM CHLORIDE, SODIUM LACTATE, POTASSIUM CHLORIDE, AND CALCIUM CHLORIDE: .6; .31; .03; .02 INJECTION, SOLUTION INTRAVENOUS at 10:05

## 2018-05-20 RX ADMIN — ROPIVACAINE HYDROCHLORIDE 14 ML/HR: 2 INJECTION, SOLUTION EPIDURAL; INFILTRATION at 12:05

## 2018-05-20 RX ADMIN — SODIUM CHLORIDE, SODIUM LACTATE, POTASSIUM CHLORIDE, AND CALCIUM CHLORIDE 1000 ML: .6; .31; .03; .02 INJECTION, SOLUTION INTRAVENOUS at 11:05

## 2018-05-20 RX ADMIN — Medication 333 MILLI-UNITS/MIN: at 04:05

## 2018-05-20 RX ADMIN — IBUPROFEN 600 MG: 600 TABLET ORAL at 05:05

## 2018-05-20 RX ADMIN — ONDANSETRON 8 MG: 8 TABLET, ORALLY DISINTEGRATING ORAL at 05:05

## 2018-05-20 RX ADMIN — HYDROCODONE BITARTRATE AND ACETAMINOPHEN 1 TABLET: 5; 325 TABLET ORAL at 10:05

## 2018-05-20 RX ADMIN — ROPIVACAINE HYDROCHLORIDE 1 ML: 2 INJECTION, SOLUTION EPIDURAL; INFILTRATION at 12:05

## 2018-05-20 RX ADMIN — IBUPROFEN 600 MG: 600 TABLET ORAL at 11:05

## 2018-05-20 RX ADMIN — ANTACID TABLETS 500 MG: 500 TABLET, CHEWABLE ORAL at 10:05

## 2018-05-20 NOTE — SUBJECTIVE & OBJECTIVE
Interval History:  Darryl is a 27 y.o.  at 40w2d. She is doing well.     Objective:     Vital Signs (Most Recent):  Temp: 98.7 °F (37.1 °C) (18 1222)  Pulse: 75 (18 1317)  Resp: 20 (18 1302)  BP: 111/69 (18 1317)  SpO2: 99 % (18 1224) Vital Signs (24h Range):  Temp:  [98.4 °F (36.9 °C)-98.9 °F (37.2 °C)] 98.7 °F (37.1 °C)  Pulse:  [69-99] 75  Resp:  [20] 20  SpO2:  [99 %] 99 %  BP: ()/(47-81) 111/69     Weight: 106.1 kg (234 lb)  Body mass index is 37.77 kg/m².    FHT: 135Cat 1 (reassuring)  TOCO:  Q 2-4 minutes    Cervical Exam:  Dilation:  3  Effacement:  70  Station: -1  Presentation: Vertex     Significant Labs:  Lab Results   Component Value Date    GROUPTRH O POS 2018    HEPBSAG Negative 10/09/2017    STREPBCULT No Group B Streptococcus isolated 2018       I have personallly reviewed all pertinent lab results from the last 24 hours.    Physical Exam

## 2018-05-20 NOTE — PROGRESS NOTES
[facility]  Obstetrics  Labor Progress Note    Patient Name: Darryl Wall  MRN: [id number]  Admission Date: 2018  Hospital Length of Stay: 0 days  Attending Physician: Brittney White MD  Primary Care Provider: J Carlos Haney NP    Subjective:     Principal Problem:<principal problem not specified>    Hospital Course:  Admitted with SROM at 0600      Interval History:  Darryl is a 27 y.o.  at 40w2d. She is doing well.     Objective:     Vital Signs (Most Recent):  Temp: 98.7 °F (37.1 °C) (18 1222)  Pulse: 75 (18 1317)  Resp: 20 (18 1302)  BP: 111/69 (18 1317)  SpO2: 99 % (18 1224) Vital Signs (24h Range):  Temp:  [98.4 °F (36.9 °C)-98.9 °F (37.2 °C)] 98.7 °F (37.1 °C)  Pulse:  [69-99] 75  Resp:  [20] 20  SpO2:  [99 %] 99 %  BP: ()/(47-81) 111/69     Weight: 106.1 kg (234 lb)  Body mass index is 37.77 kg/m².    FHT: 135Cat 1 (reassuring)  TOCO:  Q 2-4 minutes    Cervical Exam:  Dilation:  3  Effacement:  70  Station: -1  Presentation: Vertex     Significant Labs:  Lab Results   Component Value Date    GROUPTRH O POS 2018    HEPBSAG Negative 10/09/2017    STREPBCULT No Group B Streptococcus isolated 2018       I have personallly reviewed all pertinent lab results from the last 24 hours.    Physical Exam    Assessment/Plan:     27 y.o. female  at 40w2d for:    Amniotic fluid leaking      40w 2d  PROM  Cervix 1 1/2cm  Desires epidural  P Admit   Pitocin  Epidural  1350- Comfortable from epidural  Cervix- 3cm/70%  P Continue pitocin augmentation  Anticipate ONOFRE Villela, JESUS  Obstetrics  [facility]

## 2018-05-20 NOTE — ANESTHESIA PREPROCEDURE EVALUATION
05/20/2018  Darryl Wall is a 27 y.o., female.    Anesthesia Evaluation    I have reviewed the Patient Summary Reports.    I have reviewed the Nursing Notes.   I have reviewed the Medications.     Review of Systems  Anesthesia Hx:  No previous Anesthesia  Denies Family Hx of Anesthesia complications.    Cardiovascular:  Cardiovascular Normal     Pulmonary:  Pulmonary Normal    Neurological:  Neurology Normal    Endocrine:  Endocrine Normal        Physical Exam  General:  Obesity    Airway/Jaw/Neck:  Airway Findings: Mouth Opening: Normal Tongue: Normal  Mallampati: I      Dental:  DENTAL FINDINGS: Normal   Chest/Lungs:  Chest/Lungs Findings: Normal Respiratory Rate     Heart/Vascular:  Heart Findings: Normal            Anesthesia Plan  Type of Anesthesia, risks & benefits discussed:  Anesthesia Type:  epidural  Patient's Preference:   Intra-op Monitoring Plan:   Intra-op Monitoring Plan Comments:   Post Op Pain Control Plan:   Post Op Pain Control Plan Comments:   Induction:    Beta Blocker:  Patient is not currently on a Beta-Blocker (No further documentation required).       Informed Consent: Patient understands risks and agrees with Anesthesia plan.  Questions answered. Anesthesia consent signed with patient.  ASA Score: 2     Day of Surgery Review of History & Physical: I have interviewed and examined the patient. I have reviewed the patient's H&P dated:  There are no significant changes.          Ready For Surgery From Anesthesia Perspective.

## 2018-05-20 NOTE — ASSESSMENT & PLAN NOTE
40w 2d  PROM  Cervix 1 1/2cm  Desires epidural  P Admit   Pitocin  Epidural  1350- Comfortable from epidural  Cervix- 3cm/70%  P Continue pitocin augmentation  Anticipate

## 2018-05-20 NOTE — L&D DELIVERY NOTE
[facility] -   Vaginal Delivery   Operative Note    SUMMARY     Normal spontaneous vaginal delivery of live infant, was placed on mothers abdomen for skin to skin and bulb suctioning performed.  Infant delivered position DANIEL over intact perineum.  Nuchal cord: Yes, cord reduced following delivery.    Spontaneous delivery of placenta and IV pitocin given noting good uterine tone.  No lacerations noted.  cc  Patient tolerated delivery well. Sponge needle and lap counted correctly x2.    Indications: <principal problem not specified>  Pregnancy complicated by:   Patient Active Problem List   Diagnosis    Iron deficiency anemia     (normal spontaneous vaginal delivery)    Single live birth     Admitting GA: 40w2d    Delivery Information for  Greg Wall    Birth information:  YOB: 2020   Time of birth: 4:07 PM   Sex: female   Head Delivery Date/Time: 2018  4:07 PM   Delivery type: Vaginal, Spontaneous Delivery   Gestational Age: 40w2d    Delivery Providers    Delivering clinician:  Dawn Villela CNM   Provider Role    Viola Fleming RN Registered Nurse    Mabel Salazar Medical Student    Thao Hamlin RN Registered Nurse    Melissa Price                  Assessment    No data filed          Assisted Delivery Details:    Forceps attempted?:  No  Vacuum extractor attempted?:  No         Shoulder Dystocia    Shoulder dystocia present?:  No           Presentation and Position    Presentation:  Vertex           Interventions/Resuscitation         Cord    Vessels:  3 vessels  Complications:  None  Delayed Cord Clamping?:  Yes  Cord Clamped Date/Time:  2018  4:09 PM  Cord Blood Disposition:  Lab  Gases Sent?:  No  Stem Cell Collection (by MD):  No             Labor Events:       labor: No     Labor Onset Date/Time:         Dilation Complete Date/Time:         Start Pushing Date/Time: 2018 16:03     Rupture Date/Time:              Rupture  type:           Fluid Amount:        Fluid Color:        Fluid Odor:        Membrane Status (PeriCalm): SRM (Spontaneous Rupture)      Rupture Date/Time (PeriCalm): 2018 06:00:00      Fluid Amount (PeriCalm): Large      Fluid Color (PeriCalm): Clear       steroids: None     Antibiotics given for GBS: No     Induction: none     Indications for induction:        Augmentation: oxytocin     Indications for augmentation:       Labor complications: None     Additional complications:          Cervical ripening:                     Delivery:      Episiotomy: None     Indication for Episiotomy:       Perineal Lacerations: None Repaired:      Periurethral Laceration: none Repaired:     Labial Laceration: none Repaired:     Sulcus Laceration: none Repaired:     Vaginal Laceration:   Repaired:     Cervical Laceration:   Repaired:     Repair suture:       Repair # of packets:       Vaginal delivery QBL (mL):        QBL (mL): 0     Combined Blood Loss (mL): 0     Vaginal Sweep Performed:       Surgicount Correct:         Other providers:       Anesthesia    Method:  Epidural          Details (if applicable):  Trial of Labor      Categorization:      Priority:     Indications for :     Incision Type:       Additional  information:  Forceps:    Vacuum:    Breech:    Observed anomalies    Other (Comments):

## 2018-05-20 NOTE — SUBJECTIVE & OBJECTIVE
Obstetric HPI:  Patient reports None contractions, active fetal movement, No vaginal bleeding , Yes loss of fluid     This pregnancy has been complicated by hyperemesis gravidarum and anemia.    Obstetric History       T2      L2     SAB0   TAB0   Ectopic0   Multiple0   Live Births2       # Outcome Date GA Lbr Mo/2nd Weight Sex Delivery Anes PTL Lv   3 Current            2 Term 09    F Vag-Spont   DEB   1 Term 06    F Vag-Spont EPI  DEB        No past medical history on file.  No past surgical history on file.    PTA Medications   Medication Sig    ferrous sulfate 324 mg (65 mg iron) TbEC Take 325 mg by mouth once daily.    levonorgestrel (MIRENA) 20 mcg/24 hr (5 years) IUD 1 Intra Uterine Device by Intrauterine route once.    ondansetron (ZOFRAN-ODT) 4 MG TbDL Take 1 tablet (4 mg total) by mouth every 8 (eight) hours as needed.    promethazine (PHENERGAN) 25 MG tablet Take 1 tablet (25 mg total) by mouth every 6 (six) hours as needed for Nausea.       Review of patient's allergies indicates:  No Known Allergies     Family History     Problem Relation (Age of Onset)    Breast cancer Maternal Grandmother        Social History Main Topics    Smoking status: Never Smoker    Smokeless tobacco: Never Used    Alcohol use No    Drug use: No    Sexual activity: Yes     Partners: Male     Review of Systems   Constitutional: Negative.    HENT: Negative.    Eyes: Negative.    Respiratory: Negative.    Cardiovascular: Negative.    Gastrointestinal: Negative.    Endocrine: Negative.    Genitourinary: Negative.    Musculoskeletal: Negative.    Skin:  Negative.   Neurological: Negative.    Hematological: Negative.    Psychiatric/Behavioral: Negative.    Breast: negative.    All other systems reviewed and are negative.     Objective:     Vital Signs (Most Recent):  Pulse: 85 (18 0901)  BP: 128/69 (18 0901) Vital Signs (24h Range):  Pulse:  [85-99] 85  BP: (128-130)/(69-81) 128/69         There is no height or weight on file to calculate BMI.    FHT: 130Cat 1 (reassuring)  TOCO:  Q 60 minutes    Physical Exam:   Constitutional: She is oriented to person, place, and time. She appears well-developed and well-nourished.     Eyes: Conjunctivae are normal. Pupils are equal, round, and reactive to light.    Neck: Normal range of motion.    Cardiovascular: Normal rate and regular rhythm.     Pulmonary/Chest: Breath sounds normal.        Abdominal: Soft.     Genitourinary: Vagina normal and uterus normal.           Musculoskeletal: Normal range of motion and moves all extremeties.       Neurological: She is alert and oriented to person, place, and time.    Skin: Skin is warm.    Psychiatric: She has a normal mood and affect. Her behavior is normal. Thought content normal.       Cervix:  Dilation:  1.5 cm  Effacement:  50%  Station: -2  Presentation: Vertex     Significant Labs:  Lab Results   Component Value Date    GROUPTRH O POS 10/09/2017    HEPBSAG Negative 10/09/2017    STREPBCULT No Group B Streptococcus isolated 04/17/2018       I have personallly reviewed all pertinent lab results from the last 24 hours.

## 2018-05-20 NOTE — H&P
[facility] -   Obstetrics  History & Physical    Patient Name: Darryl Wall  MRN: [id number]  Admission Date: 2018  Primary Care Provider: J Carlos Haney NP    Subjective:     Principal Problem:<principal problem not specified>    History of Present Illness:   IUP at 40w2d  SROM 0600    Obstetric HPI:  Patient reports None contractions, active fetal movement, No vaginal bleeding , Yes loss of fluid     This pregnancy has been complicated by hyperemesis gravidarum and anemia.    Obstetric History       T2      L2     SAB0   TAB0   Ectopic0   Multiple0   Live Births2       # Outcome Date GA Lbr Mo/2nd Weight Sex Delivery Anes PTL Lv   3 Current            2 Term 09    F Vag-Spont   DEB   1 Term 06    F Vag-Spont EPI  DEB        No past medical history on file.  No past surgical history on file.    PTA Medications   Medication Sig    ferrous sulfate 324 mg (65 mg iron) TbEC Take 325 mg by mouth once daily.    levonorgestrel (MIRENA) 20 mcg/24 hr (5 years) IUD 1 Intra Uterine Device by Intrauterine route once.    ondansetron (ZOFRAN-ODT) 4 MG TbDL Take 1 tablet (4 mg total) by mouth every 8 (eight) hours as needed.    promethazine (PHENERGAN) 25 MG tablet Take 1 tablet (25 mg total) by mouth every 6 (six) hours as needed for Nausea.       Review of patient's allergies indicates:  No Known Allergies     Family History     Problem Relation (Age of Onset)    Breast cancer Maternal Grandmother        Social History Main Topics    Smoking status: Never Smoker    Smokeless tobacco: Never Used    Alcohol use No    Drug use: No    Sexual activity: Yes     Partners: Male     Review of Systems   Constitutional: Negative.    HENT: Negative.    Eyes: Negative.    Respiratory: Negative.    Cardiovascular: Negative.    Gastrointestinal: Negative.    Endocrine: Negative.    Genitourinary: Negative.    Musculoskeletal: Negative.    Skin:  Negative.   Neurological:  Negative.    Hematological: Negative.    Psychiatric/Behavioral: Negative.    Breast: negative.    All other systems reviewed and are negative.     Objective:     Vital Signs (Most Recent):  Pulse: 85 (18 0901)  BP: 128/69 (18 0901) Vital Signs (24h Range):  Pulse:  [85-99] 85  BP: (128-130)/(69-81) 128/69        There is no height or weight on file to calculate BMI.    FHT: 130Cat 1 (reassuring)  TOCO:  Q 60 minutes    Physical Exam:   Constitutional: She is oriented to person, place, and time. She appears well-developed and well-nourished.     Eyes: Conjunctivae are normal. Pupils are equal, round, and reactive to light.    Neck: Normal range of motion.    Cardiovascular: Normal rate and regular rhythm.     Pulmonary/Chest: Breath sounds normal.        Abdominal: Soft.     Genitourinary: Vagina normal and uterus normal.           Musculoskeletal: Normal range of motion and moves all extremeties.       Neurological: She is alert and oriented to person, place, and time.    Skin: Skin is warm.    Psychiatric: She has a normal mood and affect. Her behavior is normal. Thought content normal.       Cervix:  Dilation:  1.5 cm  Effacement:  50%  Station: -2  Presentation: Vertex     Significant Labs:  Lab Results   Component Value Date    GROUPTRH O POS 10/09/2017    HEPBSAG Negative 10/09/2017    STREPBCULT No Group B Streptococcus isolated 2018       I have personallly reviewed all pertinent lab results from the last 24 hours.    Assessment/Plan:     27 y.o. female  at 40w2d for:    Amniotic fluid leaking      40w 2d  PROM  Cervix 1 1/2cm  Desires epidural  P Admit   Pitocin  Epidural          KRISTA Villela CNM  Obstetrics  [facility] [place]

## 2018-05-20 NOTE — ANESTHESIA PROCEDURE NOTES
Epidural    Patient location during procedure: OB   Reason for block: primary anesthetic   Diagnosis: labor   Start time: 5/20/2018 11:54 AM  Timeout: 5/20/2018 11:50 AM  End time: 5/20/2018 12:13 PM  Staffing  Anesthesiologist: PRAVIN BOLAND  Performed: anesthesiologist   Preanesthetic Checklist  Completed: patient identified, surgical consent, pre-op evaluation, timeout performed, IV checked, risks and benefits discussed, monitors and equipment checked, anesthesia consent given, hand hygiene performed and patient being monitored  Preparation  Patient position: sitting  Prep: Betadine  Patient monitoring: ECG, continuous capnometry and Blood Pressure  Epidural  Skin Anesthetic: lidocaine 1%  Skin Wheal: 5 mL  Administration type: continuous  Approach: midline  Interspace: L3-4  Injection technique: MINH air  Needle and Epidural Catheter  Needle type: Tuohy   Needle gauge: 17  Needle length: 3.5 inches  Catheter type: multi-orifice  Additional Documentation: no paresthesia on injection  Needle localization: anatomical landmarks  Medications:  Bolus administered: 13 mL of 0.2% ropivacaine  Volume per aspiration: 3 mL  Time between aspirations: 2 minutes

## 2018-05-21 PROCEDURE — 99231 SBSQ HOSP IP/OBS SF/LOW 25: CPT | Mod: ,,, | Performed by: ADVANCED PRACTICE MIDWIFE

## 2018-05-21 PROCEDURE — 11000001 HC ACUTE MED/SURG PRIVATE ROOM

## 2018-05-21 PROCEDURE — 25000003 PHARM REV CODE 250: Performed by: ADVANCED PRACTICE MIDWIFE

## 2018-05-21 RX ADMIN — IBUPROFEN 600 MG: 600 TABLET ORAL at 05:05

## 2018-05-21 RX ADMIN — HYDROCODONE BITARTRATE AND ACETAMINOPHEN 1 TABLET: 5; 325 TABLET ORAL at 03:05

## 2018-05-21 RX ADMIN — FERROUS SULFATE TAB EC 325 MG (65 MG FE EQUIVALENT) 325 MG: 325 (65 FE) TABLET DELAYED RESPONSE at 08:05

## 2018-05-21 RX ADMIN — HYDROCODONE BITARTRATE AND ACETAMINOPHEN 1 TABLET: 5; 325 TABLET ORAL at 07:05

## 2018-05-21 RX ADMIN — IBUPROFEN 600 MG: 600 TABLET ORAL at 11:05

## 2018-05-21 NOTE — SUBJECTIVE & OBJECTIVE
Hospital course: Admitted with SROM at 0600  5/21/18 routine PP care, day 1    Interval History: PPD #1    She is doing well this morning. She is tolerating a regular diet without nausea or vomiting. She is voiding spontaneously. She is ambulating. She has passed flatus, and has not a BM. Vaginal bleeding is mild. She denies fever or chills. Abdominal pain is mild and controlled with oral medications. She is not breastfeeding. She desires circumcision for her male baby: yes.    Objective:     Vital Signs (Most Recent):  Temp: 97.9 °F (36.6 °C) (05/21/18 0800)  Pulse: 75 (05/21/18 0800)  Resp: 18 (05/21/18 0800)  BP: 113/69 (05/21/18 0800)  SpO2: 99 % (05/20/18 1224) Vital Signs (24h Range):  Temp:  [97.9 °F (36.6 °C)-98.9 °F (37.2 °C)] 97.9 °F (36.6 °C)  Pulse:  [67-99] 75  Resp:  [18-20] 18  SpO2:  [99 %] 99 %  BP: ()/(45-86) 113/69     Weight: 106.1 kg (234 lb)  Body mass index is 37.77 kg/m².      Intake/Output Summary (Last 24 hours) at 05/21/18 0835  Last data filed at 05/21/18 0730   Gross per 24 hour   Intake          1785.92 ml   Output             1100 ml   Net           685.92 ml       Significant Labs:  Lab Results   Component Value Date    GROUPTRH O POS 05/20/2018    HEPBSAG Negative 10/09/2017    STREPBCULT No Group B Streptococcus isolated 04/17/2018       Recent Labs  Lab 05/20/18  0920   HGB 9.1*   HCT 27.6*       I have personallly reviewed all pertinent lab results from the last 24 hours.    Physical Exam:   Constitutional: She is oriented to person, place, and time. She appears well-developed and well-nourished.             Abdominal: Soft.   Fundus firm below umbilicus     Genitourinary:   Genitourinary Comments: Small lochia           Musculoskeletal: Normal range of motion and moves all extremeties.       Neurological: She is alert and oriented to person, place, and time.    Skin: Skin is warm and dry.    Psychiatric: She has a normal mood and affect. Her behavior is normal.

## 2018-05-21 NOTE — PROGRESS NOTES
[facility] -   Obstetrics  Postpartum Progress Note    Patient Name: Darryl Wall  MRN: [id number]  Admission Date: 2018  Hospital Length of Stay: 1 days  Attending Physician: Brittney White MD  Primary Care Provider: J Carlos Haney NP    Subjective:     Principal Problem: (normal spontaneous vaginal delivery)    Hospital course: Admitted with SROM at 0600  18 routine PP care, day 1    Interval History: PPD #1    She is doing well this morning. She is tolerating a regular diet without nausea or vomiting. She is voiding spontaneously. She is ambulating. She has passed flatus, and has not a BM. Vaginal bleeding is mild. She denies fever or chills. Abdominal pain is mild and controlled with oral medications. She is not breastfeeding. She desires circumcision for her male baby: yes.    Objective:     Vital Signs (Most Recent):  Temp: 97.9 °F (36.6 °C) (18 0800)  Pulse: 75 (18 0800)  Resp: 18 (18 0800)  BP: 113/69 (18 0800)  SpO2: 99 % (18 1224) Vital Signs (24h Range):  Temp:  [97.9 °F (36.6 °C)-98.9 °F (37.2 °C)] 97.9 °F (36.6 °C)  Pulse:  [67-99] 75  Resp:  [18-20] 18  SpO2:  [99 %] 99 %  BP: ()/(45-86) 113/69     Weight: 106.1 kg (234 lb)  Body mass index is 37.77 kg/m².      Intake/Output Summary (Last 24 hours) at 18 0835  Last data filed at 18 0730   Gross per 24 hour   Intake          1785.92 ml   Output             1100 ml   Net           685.92 ml       Significant Labs:  Lab Results   Component Value Date    GROUPTRH O POS 2018    HEPBSAG Negative 10/09/2017    STREPBCULT No Group B Streptococcus isolated 2018       Recent Labs  Lab 18  0920   HGB 9.1*   HCT 27.6*       I have personallly reviewed all pertinent lab results from the last 24 hours.    Physical Exam:   Constitutional: She is oriented to person, place, and time. She appears well-developed and well-nourished.             Abdominal: Soft.    Fundus firm below umbilicus     Genitourinary:   Genitourinary Comments: Small lochia           Musculoskeletal: Normal range of motion and moves all extremeties.       Neurological: She is alert and oriented to person, place, and time.    Skin: Skin is warm and dry.    Psychiatric: She has a normal mood and affect. Her behavior is normal.       Assessment/Plan:     27 y.o. female  for:    *  (normal spontaneous vaginal delivery)    PPD #1         Single live birth    Formula feeding, well baby in the room with pt             Disposition: As patient meets milestones, will plan to discharge tomorrow.    Annalee Huang CNM  Obstetrics  [facility]

## 2018-05-21 NOTE — PLAN OF CARE
Problem: Patient Care Overview  Goal: Plan of Care Review  Outcome: Ongoing (interventions implemented as appropriate)  VVS, bleeding light, fundus firm without massage, appears to be bonding well with baby, formula feeding,  Will continue to monitor

## 2018-05-21 NOTE — PLAN OF CARE
Problem: Patient Care Overview  Goal: Plan of Care Review  Outcome: Ongoing (interventions implemented as appropriate)  Pt afebrile and had no falls this shift. Fundus firm w/out massage and below umbilicus. Bleeding light, no clots passed this shift. Voids spontaneously. Ambulates independently. Pain well controlled with oral pain medication. VSS at this time. Bonding well with infant; responds to infant cues and participates in infant care. FF infant. No other questions or concerns at this time. Will continue to monitor.

## 2018-05-21 NOTE — ANESTHESIA RELEASE NOTE
"Anesthesia Release from PACU Note    Patient: Darryl Wall    Procedure(s) Performed: * No procedures listed *    Anesthesia type: epidural    Post pain: Adequate analgesia    Post assessment: no apparent anesthetic complications, tolerated procedure well and no evidence of recall    Last Vitals:   Visit Vitals  BP (!) 107/58   Pulse 73   Temp 36.7 °C (98 °F) (Oral)   Resp 20   Ht 5' 6" (1.676 m)   Wt 106.1 kg (234 lb)   LMP 08/11/2017 (Exact Date)   SpO2 99%   Breastfeeding? Unknown   BMI 37.77 kg/m²       Post vital signs: stable    Level of consciousness: awake    Nausea/Vomiting: no nausea/no vomiting    Complications: none    Airway Patency: patent    Respiratory: unassisted    Cardiovascular: stable and blood pressure at baseline    Hydration: euvolemic  "

## 2018-05-21 NOTE — TRANSFER OF CARE
"Anesthesia Transfer of Care Note    Patient: Darryl Wall    Procedure(s) Performed: * No procedures listed *    Patient location: Labor and Delivery    Anesthesia Type: epidural    Post pain: adequate analgesia    Post assessment: no apparent anesthetic complications    Post vital signs: stable    Level of consciousness: awake, alert and oriented    Nausea/Vomiting: no nausea/vomiting    Complications: none    Transfer of care protocol was followed      Last vitals:   Visit Vitals  BP (!) 107/58   Pulse 73   Temp 36.7 °C (98 °F) (Oral)   Resp 20   Ht 5' 6" (1.676 m)   Wt 106.1 kg (234 lb)   LMP 08/11/2017 (Exact Date)   SpO2 99%   Breastfeeding? Unknown   BMI 37.77 kg/m²     "

## 2018-05-21 NOTE — ANESTHESIA POSTPROCEDURE EVALUATION
"Anesthesia Post Evaluation    Patient: Darryl Wall    Procedure(s) Performed: * No procedures listed *    Final Anesthesia Type: epidural  Patient location during evaluation: labor & delivery  Patient participation: Yes- Able to Participate  Level of consciousness: awake and alert  Post-procedure vital signs: reviewed and stable  Pain management: adequate  Airway patency: patent  PONV status at discharge: No PONV  Anesthetic complications: no      Cardiovascular status: blood pressure returned to baseline  Respiratory status: unassisted  Hydration status: euvolemic  Follow-up not needed.        Visit Vitals  BP (!) 107/58   Pulse 73   Temp 36.7 °C (98 °F) (Oral)   Resp 20   Ht 5' 6" (1.676 m)   Wt 106.1 kg (234 lb)   LMP 08/11/2017 (Exact Date)   SpO2 99%   Breastfeeding? Unknown   BMI 37.77 kg/m²       Pain/Tamanna Score: Pain Rating Prior to Med Admin: 0 (5/20/2018  5:51 PM)  Pain Rating Post Med Admin: 0 (5/20/2018  6:15 PM)      "

## 2018-05-22 VITALS
RESPIRATION RATE: 20 BRPM | HEART RATE: 78 BPM | HEIGHT: 66 IN | WEIGHT: 234 LBS | SYSTOLIC BLOOD PRESSURE: 121 MMHG | DIASTOLIC BLOOD PRESSURE: 82 MMHG | BODY MASS INDEX: 37.61 KG/M2 | OXYGEN SATURATION: 99 % | TEMPERATURE: 99 F

## 2018-05-22 PROCEDURE — 99238 HOSP IP/OBS DSCHRG MGMT 30/<: CPT | Mod: ,,, | Performed by: MIDWIFE

## 2018-05-22 PROCEDURE — 25000003 PHARM REV CODE 250: Performed by: ADVANCED PRACTICE MIDWIFE

## 2018-05-22 RX ADMIN — IBUPROFEN 600 MG: 600 TABLET ORAL at 12:05

## 2018-05-22 RX ADMIN — IBUPROFEN 600 MG: 600 TABLET ORAL at 11:05

## 2018-05-22 RX ADMIN — HYDROCODONE BITARTRATE AND ACETAMINOPHEN 1 TABLET: 7.5; 325 TABLET ORAL at 12:05

## 2018-05-22 RX ADMIN — FERROUS SULFATE TAB EC 325 MG (65 MG FE EQUIVALENT) 325 MG: 325 (65 FE) TABLET DELAYED RESPONSE at 08:05

## 2018-05-22 RX ADMIN — IBUPROFEN 600 MG: 600 TABLET ORAL at 06:05

## 2018-05-22 NOTE — PLAN OF CARE
Problem: Patient Care Overview  Goal: Plan of Care Review  Outcome: Ongoing (interventions implemented as appropriate)  Pt progressing well. Up ad aide. Pain controlled with oral pain medication. Voids spontaneously without difficulty. Fundus firm without massage. Bleeding light. Formula feeding. Bonding appropriately with baby. Will continue to monitor, VSS.

## 2018-05-22 NOTE — DISCHARGE INSTRUCTIONS

## 2018-05-22 NOTE — PHYSICIAN QUERY
PT Name: Darryl Wall  MR #: [id number]     Physician Query Form - Documentation Clarification      CDS/: KEISHA Donnelly,[email or web address]           Contact information:[email or web address]    This form is a permanent document in the medical record.     Query Date: May 22, 2018    By submitting this query, we are merely seeking further clarification of documentation. Please utilize your independent clinical judgment when addressing the question(s) below.    The Medical record reflects the following:    Supporting Clinical Findings Location in Medical Record    IUP at 40w2d  SROM 0600     Obstetric HPI:  Patient reports None contractions, active fetal movement, No vaginal bleeding , Yes loss of fluid     Amniotic fluid leaking    40w 2d  PROM  Cervix 1 1/2cm    Normal spontaneous vaginal delivery of live infant H&P                           L&D Delivery note                                                                                       Doctor, Please specify diagnosis or diagnoses associated with above clinical findings.    Provider Use Only      [ x ] PROM with onset of labor within 24 hours  [  ] PROM with onset of labor after 24 hours  [  ] PROM other or unspecified  [  ] Other, please specify:_________________________________________                                                                                                                   [  ] Clinically undetermined

## 2018-05-22 NOTE — PHYSICIAN QUERY
PT Name: Darryl Wall  MR #: [id number]     Physician Query Form - Documentation Clarification      CDS/: KEISHA Donnelly,[email or web address]            Contact information:[email or web address]    This form is a permanent document in the medical record.     Query Date: May 22, 2018    By submitting this query, we are merely seeking further clarification of documentation. Please utilize your independent clinical judgment when addressing the question(s) below.    The Medical record reflects the following:    Supporting Clinical Findings Location in Medical Record   Nuchal cord: Yes, cord reduced following delivery    Normal spontaneous vaginal delivery of live infant    Vessels:  3 vessels  Complications:  None L&D Delivery note 5/20                                                                                      Doctor, Please specify diagnosis or diagnoses associated with above clinical findings.    Provider Use Only      [ X ] Loose nuchal complicating childbirth  [  ] Tight nuchal complicating childbirth  [  ] Other, please specify:_________________________________________                                                                                                                   [  ] Clinically undetermined

## 2018-05-22 NOTE — DISCHARGE SUMMARY
[facility] -   Obstetrics  Discharge Summary      Patient Name: Darryl Wall  MRN: [id number]  Admission Date: 2018  Hospital Length of Stay: 2 days  Discharge Date and Time: 18  Attending Physician: No att. providers found   Discharging Provider: Moshe Raymundo CNM  Primary Care Provider: J Carlos Haney NP    HPI:  IUP at 40w2d  [facility] 0600    * No surgery found *     Hospital Course:   Admitted with [facility] at 0600  18 routine PP care, day 1  18--PPD #2, d/c home        Final Active Diagnoses:    Diagnosis Date Noted POA    PRINCIPAL PROBLEM:   (normal spontaneous vaginal delivery) [O80] 2018 Not Applicable    Single live birth [Z37.0] 2018 Not Applicable      Problems Resolved During this Admission:    Diagnosis Date Noted Date Resolved POA    Amniotic fluid leaking [O42.90] 2018 Unknown        Labs: All labs within the past 24 hours have been reviewed        Immunizations     Date Immunization Status Dose Route/Site Given by    18 1226 MMR Deferred 0.5 mL Subcutaneous/Left deltoid Brooklynn Salinas LPN    18 1226 Tdap Deferred 0.5 mL Intramuscular/Left deltoid Brooklynn Salinas LPN          Delivery:    Episiotomy: None   Lacerations: None   Repair suture:     Repair # of packets:     Blood loss (ml): 150     Birth information:  YOB: 2020   Time of birth: 4:07 PM   Sex: female   Delivery type: Vaginal, Spontaneous Delivery   Gestational Age: 40w2d    Delivery Clinician:      Other providers:       Additional  information:  Forceps:    Vacuum:    Breech:    Observed anomalies      Living?:           APGARS  One minute Five minutes Ten minutes   Skin color:         Heart rate:         Grimace:         Muscle tone:         Breathing:         Totals: 9  9        Placenta: Delivered:       appearance    Pending Diagnostic Studies:     None          Discharged Condition: good    Disposition: Home or Self  Care    Follow Up:  Follow-up Information     Munira Mcgrath CNM In 4 weeks.    Specialty:  Obstetrics and Gynecology  Why:  PP  Contact information:  [address] [address] [phone]                 Patient Instructions:   No discharge procedures on file.  Medications:  Discharge Medication List as of 5/22/2018 12:27 PM      CONTINUE these medications which have NOT CHANGED    Details   levonorgestrel (MIRENA) 20 mcg/24 hr (5 years) IUD 1 Intra Uterine Device by Intrauterine route once., Starting Wed 3/21/2018, Normal         STOP taking these medications       ferrous sulfate 324 mg (65 mg iron) TbEC Comments:   Reason for Stopping:         ondansetron (ZOFRAN-ODT) 4 MG TbDL Comments:   Reason for Stopping:         promethazine (PHENERGAN) 25 MG tablet Comments:   Reason for Stopping:               Moshe Raymundo CNM  Obstetrics  [facility] -

## 2018-06-21 NOTE — TELEPHONE ENCOUNTER
Ramila Ch : 6/21/2018 6:07 PM   Spoke to the patient she declined consultation and gave ok to ship medication

## 2018-06-22 NOTE — TELEPHONE ENCOUNTER
Bailey Layton LPN   Caller: Unspecified (Yesterday,  6:01 PM)             Thank you! It will be delivered 6/29/18.   Bailey

## 2018-06-29 ENCOUNTER — TELEPHONE (OUTPATIENT)
Dept: OBSTETRICS AND GYNECOLOGY | Facility: CLINIC | Age: 28
End: 2018-06-29

## 2018-06-29 NOTE — TELEPHONE ENCOUNTER
Pt mirena received at [facility] location placed in [facility] room ...chata

## 2021-01-15 ENCOUNTER — TELEPHONE (OUTPATIENT)
Dept: OBSTETRICS AND GYNECOLOGY | Facility: CLINIC | Age: 31
End: 2021-01-15

## 2022-04-11 NOTE — PROGRESS NOTES
Detail Level: Detailed Doing well, taking Rx phenergan for N/V.  Sometimes unable to keep foods down, discussed appropriate weight gain   Recommended OTC medication for heartburn  Continuing with PO iron   Good FM, reviewed FKCs and PTL s/s  Desires mirena     Coffective Motivation Document discussed with mother. Reinforced benefits of breastfeeding, risk of formula, and basic principles for skin to skin, rooming in, cue based feedings and importance of effective latch. Encouraged mother to download Coffective mobile esha if she has not already done so. Mother verbalizes understanding. States that she wants to bottle feed her baby with standard nipples.  Enfamil [place] given to infant's mother.      Add 88487 Cpt? (Important Note: In 2017 The Use Of 97917 Is Being Tracked By Cms To Determine Future Global Period Reimbursement For Global Periods): no